# Patient Record
Sex: MALE | Race: OTHER | NOT HISPANIC OR LATINO | Employment: FULL TIME | URBAN - METROPOLITAN AREA
[De-identification: names, ages, dates, MRNs, and addresses within clinical notes are randomized per-mention and may not be internally consistent; named-entity substitution may affect disease eponyms.]

---

## 2023-11-27 LAB — HBA1C MFR BLD: 6.5 % (ref 4–6)

## 2024-02-28 LAB
CHOLEST SERPL-MSCNC: 125 MG/DL (ref 0–200)
CREATININE, URINE: 57.3 MG/DL
HBA1C MFR BLD: 6.1 % (ref 4–6)
HDLC SERPL-MCNC: 38 MG/DL (ref 35–70)
HIV 1+2 AB+HIV1 P24 AG SERPL QL IA: NONREACTIVE
LDLC SERPL CALC-MCNC: 87 MG/DL (ref 0–160)
MICROALB/CREAT RATIO: 19
MICROALBUMIN URINE: 1.1
TRIGL SERPL-MCNC: 175 MG/DL (ref 40–160)

## 2024-06-12 LAB
CHOLEST SERPL-MSCNC: 159 MG/DL (ref 0–200)
CREATININE, URINE: 50 MG/DL
HBA1C MFR BLD: 7.1 % (ref 4–6)
HDLC SERPL-MCNC: 46 MG/DL (ref 35–70)
LDLC SERPL CALC-MCNC: 91 MG/DL (ref 0–160)
MICROALB/CREAT RATIO: <30
MICROALBUMIN URINE: 10
TRIGL SERPL-MCNC: 110 MG/DL (ref 40–160)

## 2024-08-08 ENCOUNTER — TELEPHONE (OUTPATIENT)
Dept: INFECTIOUS DISEASES | Facility: CLINIC | Age: 54
End: 2024-08-08

## 2024-08-08 ENCOUNTER — LAB VISIT (OUTPATIENT)
Dept: LAB | Facility: HOSPITAL | Age: 54
End: 2024-08-08
Attending: GENERAL PRACTICE
Payer: COMMERCIAL

## 2024-08-08 ENCOUNTER — OFFICE VISIT (OUTPATIENT)
Dept: INFECTIOUS DISEASES | Facility: CLINIC | Age: 54
End: 2024-08-08
Payer: COMMERCIAL

## 2024-08-08 VITALS
SYSTOLIC BLOOD PRESSURE: 132 MMHG | RESPIRATION RATE: 18 BRPM | DIASTOLIC BLOOD PRESSURE: 88 MMHG | TEMPERATURE: 98 F | HEART RATE: 76 BPM | HEIGHT: 63 IN | BODY MASS INDEX: 21.97 KG/M2 | OXYGEN SATURATION: 98 % | WEIGHT: 124 LBS

## 2024-08-08 DIAGNOSIS — A53.9 SYPHILIS: Primary | ICD-10-CM

## 2024-08-08 DIAGNOSIS — Z11.3 ROUTINE SCREENING FOR STI (SEXUALLY TRANSMITTED INFECTION): ICD-10-CM

## 2024-08-08 DIAGNOSIS — Z72.51 HIGH RISK SEXUAL BEHAVIOR, UNSPECIFIED TYPE: ICD-10-CM

## 2024-08-08 DIAGNOSIS — A52.71 OCULAR SYPHILIS: ICD-10-CM

## 2024-08-08 DIAGNOSIS — A53.9 SYPHILIS: ICD-10-CM

## 2024-08-08 LAB
ALBUMIN SERPL-MCNC: 4.7 G/DL (ref 3.5–5)
ALBUMIN/GLOB SERPL: 1.5 RATIO (ref 1.1–2)
ALP SERPL-CCNC: 66 UNIT/L (ref 40–150)
ALT SERPL-CCNC: 43 UNIT/L (ref 0–55)
ANION GAP SERPL CALC-SCNC: 9 MEQ/L
AST SERPL-CCNC: 27 UNIT/L (ref 5–34)
BASOPHILS # BLD AUTO: 0.04 X10(3)/MCL
BASOPHILS NFR BLD AUTO: 0.7 %
BILIRUB SERPL-MCNC: 0.6 MG/DL
BUN SERPL-MCNC: 18.3 MG/DL (ref 8.4–25.7)
C TRACH DNA SPEC QL NAA+PROBE: NOT DETECTED
CALCIUM SERPL-MCNC: 9.6 MG/DL (ref 8.4–10.2)
CHLORIDE SERPL-SCNC: 109 MMOL/L (ref 98–107)
CO2 SERPL-SCNC: 20 MMOL/L (ref 22–29)
CREAT SERPL-MCNC: 0.79 MG/DL (ref 0.73–1.18)
CREAT/UREA NIT SERPL: 23
EOSINOPHIL # BLD AUTO: 0.05 X10(3)/MCL (ref 0–0.9)
EOSINOPHIL NFR BLD AUTO: 0.9 %
ERYTHROCYTE [DISTWIDTH] IN BLOOD BY AUTOMATED COUNT: 12.2 % (ref 11.5–17)
GFR SERPLBLD CREATININE-BSD FMLA CKD-EPI: >60 ML/MIN/1.73/M2
GLOBULIN SER-MCNC: 3.2 GM/DL (ref 2.4–3.5)
GLUCOSE SERPL-MCNC: 145 MG/DL (ref 74–100)
HBV CORE AB SERPL QL IA: NONREACTIVE
HBV CORE IGM SERPL QL IA: NONREACTIVE
HBV SURFACE AB SER-ACNC: 36.58 MIU/ML
HBV SURFACE AB SERPL IA-ACNC: REACTIVE M[IU]/ML
HBV SURFACE AG SERPL QL IA: NONREACTIVE
HCT VFR BLD AUTO: 51 % (ref 42–52)
HCV AB SERPL QL IA: NONREACTIVE
HGB BLD-MCNC: 17 G/DL (ref 14–18)
HIV 1+2 AB+HIV1 P24 AG SERPL QL IA: NONREACTIVE
IMM GRANULOCYTES # BLD AUTO: 0.04 X10(3)/MCL (ref 0–0.04)
IMM GRANULOCYTES NFR BLD AUTO: 0.7 %
LYMPHOCYTES # BLD AUTO: 1.47 X10(3)/MCL (ref 0.6–4.6)
LYMPHOCYTES NFR BLD AUTO: 26.7 %
MCH RBC QN AUTO: 29.7 PG (ref 27–31)
MCHC RBC AUTO-ENTMCNC: 33.3 G/DL (ref 33–36)
MCV RBC AUTO: 89 FL (ref 80–94)
MONOCYTES # BLD AUTO: 0.36 X10(3)/MCL (ref 0.1–1.3)
MONOCYTES NFR BLD AUTO: 6.5 %
N GONORRHOEA DNA SPEC QL NAA+PROBE: NOT DETECTED
NEUTROPHILS # BLD AUTO: 3.55 X10(3)/MCL (ref 2.1–9.2)
NEUTROPHILS NFR BLD AUTO: 64.5 %
NRBC BLD AUTO-RTO: 0 %
PLATELET # BLD AUTO: 181 X10(3)/MCL (ref 130–400)
PLATELETS.RETICULATED NFR BLD AUTO: 5.3 % (ref 0.9–11.2)
PMV BLD AUTO: 11.6 FL (ref 7.4–10.4)
POTASSIUM SERPL-SCNC: 4.2 MMOL/L (ref 3.5–5.1)
PROT SERPL-MCNC: 7.9 GM/DL (ref 6.4–8.3)
RBC # BLD AUTO: 5.73 X10(6)/MCL (ref 4.7–6.1)
SODIUM SERPL-SCNC: 138 MMOL/L (ref 136–145)
SOURCE (OHS): NORMAL
T PALLIDUM AB SER QL: REACTIVE
WBC # BLD AUTO: 5.51 X10(3)/MCL (ref 4.5–11.5)

## 2024-08-08 PROCEDURE — 86592 SYPHILIS TEST NON-TREP QUAL: CPT

## 2024-08-08 PROCEDURE — 86780 TREPONEMA PALLIDUM: CPT

## 2024-08-08 PROCEDURE — 87340 HEPATITIS B SURFACE AG IA: CPT

## 2024-08-08 PROCEDURE — 86706 HEP B SURFACE ANTIBODY: CPT

## 2024-08-08 PROCEDURE — 87491 CHLMYD TRACH DNA AMP PROBE: CPT

## 2024-08-08 PROCEDURE — 86704 HEP B CORE ANTIBODY TOTAL: CPT

## 2024-08-08 PROCEDURE — 36415 COLL VENOUS BLD VENIPUNCTURE: CPT

## 2024-08-08 PROCEDURE — 87591 N.GONORRHOEAE DNA AMP PROB: CPT

## 2024-08-08 PROCEDURE — 86803 HEPATITIS C AB TEST: CPT

## 2024-08-08 PROCEDURE — 86705 HEP B CORE ANTIBODY IGM: CPT

## 2024-08-08 PROCEDURE — 80053 COMPREHEN METABOLIC PANEL: CPT

## 2024-08-08 PROCEDURE — 86480 TB TEST CELL IMMUN MEASURE: CPT

## 2024-08-08 PROCEDURE — 87389 HIV-1 AG W/HIV-1&-2 AB AG IA: CPT

## 2024-08-08 PROCEDURE — 85025 COMPLETE CBC W/AUTO DIFF WBC: CPT

## 2024-08-08 PROCEDURE — 99999 PR PBB SHADOW E&M-NEW PATIENT-LVL III: CPT | Mod: PBBFAC,,, | Performed by: GENERAL PRACTICE

## 2024-08-08 RX ORDER — ATORVASTATIN CALCIUM 20 MG/1
20 TABLET, FILM COATED ORAL DAILY
COMMUNITY

## 2024-08-08 RX ORDER — TIMOLOL MALEATE 5 MG/ML
SOLUTION/ DROPS OPHTHALMIC
COMMUNITY

## 2024-08-08 RX ORDER — EMTRICITABINE AND TENOFOVIR DISOPROXIL FUMARATE 200; 300 MG/1; MG/1
1 TABLET, FILM COATED ORAL DAILY
COMMUNITY

## 2024-08-08 RX ORDER — EMPAGLIFLOZIN AND LINAGLIPTIN 10; 5 MG/1; MG/1
1 TABLET, FILM COATED ORAL DAILY
COMMUNITY

## 2024-08-08 RX ORDER — LOSARTAN POTASSIUM 50 MG/1
50 TABLET ORAL DAILY
COMMUNITY

## 2024-08-08 RX ORDER — EMTRICITABINE AND TENOFOVIR ALAFENAMIDE 200; 25 MG/1; MG/1
1 TABLET ORAL DAILY
COMMUNITY

## 2024-08-08 NOTE — TELEPHONE ENCOUNTER
Called the [facility] to speak with someone about past medical history of previous diagnosis that he is now being seen here

## 2024-08-09 LAB
C TRACH RRNA SPEC QL NAA+PROBE: NEGATIVE
C TRACH RRNA SPEC QL NAA+PROBE: NEGATIVE
N GONORRHOEA RRNA SPEC QL NAA+PROBE: NEGATIVE
N GONORRHOEA RRNA SPEC QL NAA+PROBE: NEGATIVE
RPR SER QL: REACTIVE
RPR SER-TITR: ABNORMAL {TITER}
SPECIMEN SOURCE: NORMAL

## 2024-08-12 LAB
GAMMA INTERFERON BACKGROUND BLD IA-ACNC: 0.14 IU/ML
M TB IFN-G BLD-IMP: NEGATIVE
M TB IFN-G CD4+ BCKGRND COR BLD-ACNC: 0.04 IU/ML
M TB IFN-G CD4+CD8+ BCKGRND COR BLD-ACNC: 0.02 IU/ML
MITOGEN IGNF BCKGRD COR BLD-ACNC: 9.86 IU/ML

## 2024-09-05 ENCOUNTER — TELEPHONE (OUTPATIENT)
Dept: INFECTIOUS DISEASES | Facility: CLINIC | Age: 54
End: 2024-09-05
Payer: COMMERCIAL

## 2024-09-05 NOTE — TELEPHONE ENCOUNTER
His records received from his outpatient infectious disease physician.  Please see notes below.  It appears the patient has completed a course of IV penicillin as well as IV ceftriaxone as well as 3 weeks of IM benzathine penicillin as recently as 04/2024.  His RPR most recently on 02/28/2024 has been at 1:32, his repeat currently is 1:64.  Does not have any significant concerns at this time and given his extensive recent treatment, I will not immediately act on this minimal elevation in RPR.  He will need a repeat RPR in 3 months, if this continues to increase, he will have to undergo another course of therapy, would likely consider another IV course.  Patient is scheduled for follow-up with us in the office on 09/12/2024.

## 2024-09-10 ENCOUNTER — DOCUMENTATION ONLY (OUTPATIENT)
Dept: FAMILY MEDICINE | Facility: CLINIC | Age: 54
End: 2024-09-10

## 2024-09-10 ENCOUNTER — TELEPHONE (OUTPATIENT)
Dept: FAMILY MEDICINE | Facility: CLINIC | Age: 54
End: 2024-09-10

## 2024-09-10 ENCOUNTER — PATIENT OUTREACH (OUTPATIENT)
Facility: CLINIC | Age: 54
End: 2024-09-10
Payer: COMMERCIAL

## 2024-09-10 ENCOUNTER — HOSPITAL ENCOUNTER (OUTPATIENT)
Dept: RADIOLOGY | Facility: HOSPITAL | Age: 54
Discharge: HOME | End: 2024-09-10
Attending: FAMILY MEDICINE
Payer: COMMERCIAL

## 2024-09-10 ENCOUNTER — OFFICE VISIT (OUTPATIENT)
Dept: FAMILY MEDICINE | Facility: CLINIC | Age: 54
End: 2024-09-10
Payer: COMMERCIAL

## 2024-09-10 VITALS
RESPIRATION RATE: 18 BRPM | HEART RATE: 70 BPM | HEIGHT: 63 IN | SYSTOLIC BLOOD PRESSURE: 124 MMHG | WEIGHT: 123.31 LBS | DIASTOLIC BLOOD PRESSURE: 82 MMHG | BODY MASS INDEX: 21.85 KG/M2 | OXYGEN SATURATION: 98 %

## 2024-09-10 DIAGNOSIS — E78.5 HYPERLIPIDEMIA ASSOCIATED WITH TYPE 2 DIABETES MELLITUS: Primary | ICD-10-CM

## 2024-09-10 DIAGNOSIS — Z01.818 PREOP EXAMINATION: ICD-10-CM

## 2024-09-10 DIAGNOSIS — E11.65 TYPE 2 DIABETES MELLITUS WITH HYPERGLYCEMIA, WITHOUT LONG-TERM CURRENT USE OF INSULIN: ICD-10-CM

## 2024-09-10 DIAGNOSIS — R80.9 MICROALBUMINURIA: ICD-10-CM

## 2024-09-10 DIAGNOSIS — E11.59 HYPERTENSION ASSOCIATED WITH DIABETES: ICD-10-CM

## 2024-09-10 DIAGNOSIS — E11.69 HYPERLIPIDEMIA ASSOCIATED WITH TYPE 2 DIABETES MELLITUS: Primary | ICD-10-CM

## 2024-09-10 DIAGNOSIS — A52.71 OCULAR SYPHILIS: ICD-10-CM

## 2024-09-10 DIAGNOSIS — I15.2 HYPERTENSION ASSOCIATED WITH DIABETES: ICD-10-CM

## 2024-09-10 PROBLEM — I10 ESSENTIAL HYPERTENSION: Status: ACTIVE | Noted: 2024-03-12

## 2024-09-10 PROBLEM — A52.8 LATE LATENT SYPHILIS: Status: ACTIVE | Noted: 2024-02-12

## 2024-09-10 PROBLEM — E11.9 DIABETES MELLITUS: Status: ACTIVE | Noted: 2021-02-04

## 2024-09-10 PROBLEM — E55.9 VITAMIN D DEFICIENCY: Status: ACTIVE | Noted: 2021-02-04

## 2024-09-10 PROBLEM — E78.00 HYPERCHOLESTEROLEMIA: Status: ACTIVE | Noted: 2024-03-12

## 2024-09-10 PROCEDURE — 3008F BODY MASS INDEX DOCD: CPT | Mod: CPTII,,, | Performed by: FAMILY MEDICINE

## 2024-09-10 PROCEDURE — 99204 OFFICE O/P NEW MOD 45 MIN: CPT | Mod: ,,, | Performed by: FAMILY MEDICINE

## 2024-09-10 PROCEDURE — 1160F RVW MEDS BY RX/DR IN RCRD: CPT | Mod: CPTII,,, | Performed by: FAMILY MEDICINE

## 2024-09-10 PROCEDURE — 1159F MED LIST DOCD IN RCRD: CPT | Mod: CPTII,,, | Performed by: FAMILY MEDICINE

## 2024-09-10 PROCEDURE — 3079F DIAST BP 80-89 MM HG: CPT | Mod: CPTII,,, | Performed by: FAMILY MEDICINE

## 2024-09-10 PROCEDURE — 71046 X-RAY EXAM CHEST 2 VIEWS: CPT | Mod: TC

## 2024-09-10 PROCEDURE — 3074F SYST BP LT 130 MM HG: CPT | Mod: CPTII,,, | Performed by: FAMILY MEDICINE

## 2024-09-10 RX ORDER — METFORMIN HYDROCHLORIDE 850 MG/1
850 TABLET ORAL
Qty: 90 TABLET | Refills: 0 | Status: SHIPPED | OUTPATIENT
Start: 2024-09-10 | End: 2024-12-09

## 2024-09-10 RX ORDER — DORZOLAMIDE HYDROCHLORIDE AND TIMOLOL MALEATE 20; 5 MG/ML; MG/ML
1 SOLUTION/ DROPS OPHTHALMIC 2 TIMES DAILY
COMMUNITY

## 2024-09-10 RX ORDER — PREDNISOLONE ACETATE 10 MG/ML
SUSPENSION/ DROPS OPHTHALMIC
COMMUNITY

## 2024-09-10 RX ORDER — BROMFENAC SODIUM 0.7 MG/ML
SOLUTION/ DROPS OPHTHALMIC
COMMUNITY

## 2024-09-10 RX ORDER — METFORMIN HYDROCHLORIDE 850 MG/1
850 TABLET ORAL
COMMUNITY
End: 2024-09-10 | Stop reason: SDUPTHER

## 2024-09-10 RX ORDER — LOSARTAN POTASSIUM 50 MG/1
50 TABLET ORAL DAILY
Qty: 90 TABLET | Refills: 0 | Status: SHIPPED | OUTPATIENT
Start: 2024-09-10 | End: 2024-12-09

## 2024-09-10 RX ORDER — ATORVASTATIN CALCIUM 20 MG/1
20 TABLET, FILM COATED ORAL DAILY
Qty: 90 TABLET | Refills: 0 | Status: SHIPPED | OUTPATIENT
Start: 2024-09-10 | End: 2024-12-09

## 2024-09-10 NOTE — PROGRESS NOTES
Health Maintenance Topic(s) Outreach Outcomes & Actions Taken:    Colorectal Cancer Screening - Outreach Outcomes & Actions Taken  : External Records Requested & Care Team Updated if Applicable         Additional Notes:  Request Colonoscopy Report: [facility] [place]

## 2024-09-10 NOTE — TELEPHONE ENCOUNTER
Had to leave a message on the VM at Dr Kitchen's office requesting the surgery clearance form to be faxed to our office. Will follow up.

## 2024-09-10 NOTE — TELEPHONE ENCOUNTER
Colonoscopy from [facility] gastroenterology in [facility]

## 2024-09-10 NOTE — PROGRESS NOTES
Chance Vu  09/10/2024  [id number]    Pinky Cr MD  Patient Care Team:  Pinky Cr MD as PCP - General (Family Medicine)      Chief Complaint:  Chief Complaint   Patient presents with    [facility] Care     Establishing Care  Needs surgery clearance before cataract surgery  Lives in [place], [place]  Chest Pain  Approx 1 week       History of Present Illness:    54 y.o. male who presents today to establish care. 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 He has received treatment. He needs cataract surgery clearance and then will be moving back to [place], [place]. He will not be following up here. Has never had surgery before. He is asymptomatic and does not smoke.     Preop exam  Active CP-NO  Heart Failure-NO  Valvular Disease-NO  History of CVA-NO  DM? Yes On Insulin? NO  Serum Creatinine >(2.0)-NO    ?Can take care of self, such as eat, dress, or use the toilet (1 MET)-Yes  ?Can walk up a flight of steps or a hill or walk on level ground at 3 to 4 mph (4 METs)-Yes      Review of Systems  General: denies f/c, weight loss, night sweats, decreased appetite  Eye: denies blurred vision, changes in vision  Respiratory: denies sob, wheezing, cough  Cardiovascular: denies chest pain, palpitations, edema  Gastrointestinal: denies abdominal pain, n/v, constipation, diarrhea  Integumentary: denies rashes, pruritis    Past Medical History  Past Medical History:   Diagnosis Date    Diabetes mellitus, type 2     Hyperlipidemia     Hypertension     Syphilis, unspecified        Medications  Medication List with Changes/Refills   Current Medications    BROMFENAC (PROLENSA) 0.07 % DROP        DORZOLAMIDE-TIMOLOL 2-0.5% (COSOPT) 22.3-6.8 MG/ML OPHTHALMIC SOLUTION    Place 1 drop into both eyes 2 (two) times daily.    EMPAGLIFLOZIN-LINAGLIPTIN (GLYXAMBI) 10-5 MG TAB    Take 1 tablet by mouth once daily.    PREDNISOLONE ACETATE (PRED FORTE) 1 % DRPS    INSTILL 1 DROP INTO BOTH EYES SIX TIMES A DAY     "TIMOLOL MALEATE 0.5% (TIMOPTIC) 0.5 % DROP    INSTILL 1 DROP INTO LEFT EYE TWICE A DAY   Changed and/or Refilled Medications    Modified Medication Previous Medication    ATORVASTATIN (LIPITOR) 20 MG TABLET atorvastatin (LIPITOR) 20 MG tablet       Take 1 tablet (20 mg total) by mouth once daily.    Take 20 mg by mouth once daily.    LOSARTAN (COZAAR) 50 MG TABLET losartan (COZAAR) 50 MG tablet       Take 1 tablet (50 mg total) by mouth once daily.    Take 50 mg by mouth once daily.    METFORMIN (GLUCOPHAGE) 850 MG TABLET metFORMIN (GLUCOPHAGE) 850 MG tablet       Take 1 tablet (850 mg total) by mouth daily with breakfast.    Take 850 mg by mouth.   Discontinued Medications    EMTRICITABINE-TENOFOVIR 200-300 MG (TRUVADA) 200-300 MG TAB    Take 1 tablet by mouth once daily.    EMTRICITABINE-TENOFOVIR ALAFEN (DESCOVY) 200-25 MG TAB    Take 1 tablet by mouth once daily.       Past Surgical History:   Procedure Laterality Date    CIRCUMCISION         SUBJECTIVE:  Health Maintenance  Health Maintenance Topics with due status: Not Due       Topic Last Completion Date    Low Dose Statin 09/10/2024    Diabetes Urine Screening 09/10/2024    Lipid Panel 09/10/2024    Hemoglobin A1c 09/10/2024     Health Maintenance Due   Topic Date Due    Pneumococcal Vaccines (Age 0-64) (1 of 2 - PCV) Never done    Foot Exam  Never done    Eye Exam  Never done    TETANUS VACCINE  Never done    Colorectal Cancer Screening  Never done    Influenza Vaccine (1) 09/01/2024    COVID-19 Vaccine (4 - 2023-24 season) 09/01/2024       Exam:  Vitals:    09/10/24 0757   BP: 124/82   BP Location: Right arm   Patient Position: Sitting   BP Method: Small (Automatic)   Pulse: 70   Resp: 18   SpO2: 98%   Weight: 55.9 kg (123 lb 4.8 oz)   Height: 5' 3" (1.6 m)     Weight: 55.9 kg (123 lb 4.8 oz)   Body mass index is 21.84 kg/m².      Physical Exam  Constitutional: NAD, alert, pleasant  Respiratory: CTAB, no wheezes, rales or rhonchi. No accessory muscle " use  Eyes: EOMI  Cardiovascular: RRR, No m/r/g. No JVD. No LE edema  Gastrointestinal: BS+, nontender, nondistended  Integumentary: warm, dry, intact  Psych: AA&Ox3      ICD-10-CM ICD-9-CM   1. Hyperlipidemia associated with type 2 diabetes mellitus  E11.69 250.80    E78.5 272.4   2. Type 2 diabetes mellitus with hyperglycemia, without long-term current use of insulin  E11.65 250.00     790.29   3. Ocular syphilis  A52.71 095.8     363.13   4. Microalbuminuria  R80.9 791.0   5. Preop examination  Z01.818 V72.84   6. Hypertension associated with diabetes  E11.59 250.80    I15.2 401.9       1. Hyperlipidemia associated with type 2 diabetes mellitus  Overview:  Ldl 91 on lipitor 20 mg daily.     Continue current Rx meds      Orders:  -     CBC Auto Differential; Future; Expected date: 09/10/2024  -     Comprehensive Metabolic Panel; Future; Expected date: 09/10/2024    2. Type 2 diabetes mellitus with hyperglycemia, without long-term current use of insulin  Overview:  A1c increased from 6.1 to 7.1 June 2024. Supposed to be on glyxambi but has been out for 2 wks and found an old script of metformin 850 mg daily. Not having diarrhea. Fasting sugars 114-150's.    Labs ordered  Refill metformin  Hold glyxambi for now    Orders:  -     EKG 12-lead; Future; Expected date: 09/10/2024  -     X-Ray Chest PA And Lateral; Future; Expected date: 09/10/2024  -     CBC Auto Differential; Future; Expected date: 09/10/2024  -     Comprehensive Metabolic Panel; Future; Expected date: 09/10/2024  -     Lipid Panel; Future; Expected date: 09/10/2024  -     Hemoglobin A1C; Future; Expected date: 09/10/2024  -     Urinalysis; Future; Expected date: 09/10/2024  -     Microalbumin/Creatinine Ratio, Urine; Future; Expected date: 09/10/2024    3. Ocular syphilis  Overview:  Followed by ID. Has completed treatment with little improvement in RPR.       4. Microalbuminuria  Overview:  Last urine microalbumin 10. A1c increased to 7.1. On  ARB    Labs and microalbumin ordered    Orders:  -     Microalbumin/Creatinine Ratio, Urine; Future; Expected date: 09/10/2024    5. Preop examination  -     EKG 12-lead; Future; Expected date: 09/10/2024  -     X-Ray Chest PA And Lateral; Future; Expected date: 09/10/2024  -     CBC Auto Differential; Future; Expected date: 09/10/2024  -     Comprehensive Metabolic Panel; Future; Expected date: 09/10/2024  -     Lipid Panel; Future; Expected date: 09/10/2024  -     Hemoglobin A1C; Future; Expected date: 09/10/2024  -     Urinalysis; Future; Expected date: 09/10/2024  -     Microalbumin/Creatinine Ratio, Urine; Future; Expected date: 09/10/2024    6. Hypertension associated with diabetes  Overview:  At goal on losartan 50 mg daily. Asymptomatic    Continue current Rx meds  Refill losartan      Other orders  -     metFORMIN (GLUCOPHAGE) 850 MG tablet; Take 1 tablet (850 mg total) by mouth daily with breakfast.  Dispense: 90 tablet; Refill: 0  -     atorvastatin (LIPITOR) 20 MG tablet; Take 1 tablet (20 mg total) by mouth once daily.  Dispense: 90 tablet; Refill: 0  -     losartan (COZAAR) 50 MG tablet; Take 1 tablet (50 mg total) by mouth once daily.  Dispense: 90 tablet; Refill: 0       Will need labs, EKG, cxr for risk stratification  Refill metformin, lipitor and losartan  Hold glyxambi for now since fasting sugars are at goal.     Labs, EKG, cxr reviewed. Patient is low risk for low risk procedure  Follow up: No follow-ups on file.      Care Plan/Goals: Reviewed   Goals    None

## 2024-09-10 NOTE — LETTER
AUTHORIZATION FOR RELEASE OF CONFIDENTIAL INFORMATION      09/10/2024      Dear [facility],    We are seeing Chance Vu, date of birth 1970, in the clinic at [facility].  Pinky Cr MD is the patient's PCP. Chance Vu has an outstanding lab/procedure at the time we reviewed his chart.  In order to help keep his health information updated, Chance has authorized us to request the following medical record(s):          Colonoscopy           Please fax any records to Pinky Cr MD's at  [phone]    If you have any questions, please contact  Anil REICH,CCC @ [phone]             Patient Name: Chance Vu  :1970  Patient Phone #:[phone]                Chance Vu  MRN: [id number]  : 1970  Age: 54 y.o.  Sex: male         Patient/Legal Guardian Signature  This signature was collected at 2024    Chance Vu       _______________________________   Printed Name/Relationship to Patient      Consent for Examination and Treatment: I hereby authorize the providers and employees of [facility] ([facility]) to provide medical treatment/services which includes, but is not limited to, performing and administering tests and diagnostic procedures that are deemed necessary, including, but not limited to, imaging examinations, blood tests and other laboratory procedures as may be required by the hospital, clinic, or may be ordered by my physician(s) or persons working under the general and/or special instructions of my physician(s).      I understand and agree that this consent covers all authorized persons, including but not limited to physicians, residents, nurse practitioners, physicians' assistants, specialists, consultants, student nurses, and independently contracted physicians, who are called upon by the physician in charge, to carry out the diagnostic procedures and medical or surgical treatment.     I hereby  authorize [facility] to retain or dispose of any specimens or tissue, should there be such remaining from any test or procedure.     I hereby authorize and give consent for [facility] providers and employees to take photographs, images or videotapes of such diagnostic, surgical or treatment procedures of Patient as may be required by [facility] or as may be ordered by a physician. I further acknowledge and agree that [facility] may use cameras or other devices for patient monitoring.     I am aware that the practice of medicine is not an exact science, and I acknowledge that no guarantees have been made to me as to the outcome of any tests, procedures or treatment.     Authorization for Release of Information: I understand that my insurance company and/or their agents may need information necessary to make determinations about payment/reimbursement. I hereby provide authorization to release to all insurance companies, their successors, assignees, other parties with whom they may have contracted, or others acting on their behalf, that are involved with payment for any hospital and/or clinic charges incurred by the patient, any information that they request and deem necessary for payment/reimbursement, and/or quality review.  I further authorize the release of my health information to physicians or other health care practitioners on staff who are involved in my health care now and in the future, and to other health care providers, entities, or institutions for the purpose of my continued care and treatment, including referrals.     REGISTRATION AUTHORIZATION  Form No. 04816 (Rev. 3/25/2024)    Page 1 of 3                       Medicare Patient's Certification and Authorization to Release Information and Payment Request:  I certify that the information given by me in applying for payment under Title XVIII of the Social Security Act is correct. I authorize any huggins of medical or other information about me to release to the [facility], or its intermediaries or carriers, any information needed for this or a related Medicare claim. I request that payment of authorized benefits be made on my behalf.     Assignment of Insurance Benefits:   I hereby authorize any and all insurance companies, health plans, defined   benefit plans, health insurers or any entity that is or may be responsible for payment of my medical expenses to pay all hospital and medical benefits now due, and to become due and payable to me under any hospital benefits, sick benefits, injury benefits or any other benefit for services rendered to me, including Major Medical Benefits, direct to [facility] and all independently contracted physicians. I assign any and all rights that I may have against any and all insurance companies, health plans, defined benefit plans, health insurers or any entity that is or may be responsible for payment of my medical expenses, including, but not limited to any right to appeal a denial of a claim, any right to bring any action, lawsuit, administrative proceeding, or other cause of action on my behalf. I specifically assign my right to pursue litigation against any and all insurance companies, health plans, defined benefit plans, health insurers or any entity that is or may be responsible for payment of my medical expenses based upon a refusal to pay charges.            E. Valuables: It is understood and agreed that [facility] is not liable for the damage to or loss of any money, jewelry,   documents, dentures, eye glasses, hearing aids, prosthetics, or other property of value.     F. Computer Equipment: I understand and agree that should I choose to use computer equipment owned by [facility] or if I choose to access the Internet via [facility]s network, I do so at my own risk. [facility] is not responsible for any damage to my computer equipment or to any damages of any type that might arise from my loss of equipment or data.     G. Acceptance  of Financial Responsibility:  I agree that in consideration of the services and   supplies that have been   or will be furnished to the patient, I am hereby obligated to pay all charges made for or on the account of the patient according to the standard rates (in effect at the time the services and supplies are delivered) established by [facility], including its Patient Financial Assistance Policy to the extent it is applicable. I understand that I am responsible for all charges, or portions thereof, not covered by insurance or other sources. Patient refunds will be distributed only after balances at all [facility] facilities are paid.     H. Communication Authorization:  I hereby authorize [facility] and its representatives, along with any billing service   or  who may work on their behalf, to contact me on   my cell phone and/or home phone using pre- recorded messages, artificial voice messages, automatic telephone dialing devices or other computer assisted technology, or by electronic      mail, text messaging, or by any other form of electronic communication. This includes, but is not limited to, appointment reminders, yearly physical exam reminders, preventive care reminders, patient campaigns, welcome calls, and calls about account balances on my account or any account on which I am listed as a guarantor. I understand I have the right to opt out of these communications at any time.      Relationship  Between  Facility and  Provider:      I understand that some, but not all, providers furnishing services to the patient are not employees or agents of [facility]. The patient is under the care and supervision of his/her attending physician, and it is the responsibility of the facility and its nursing staff to carry out the instructions of such physicians. It is the responsibility of the patient's physician/designee to obtain the patient's informed consent, when required, for medical or surgical treatment,  special diagnostic or therapeutic procedures, or hospital services rendered for the patient under the special instructions of the physician/designee.           REGISTRATION AUTHORIZATION  Form No. 25264 (Rev. 3/25/2024)    Page 2 of 3                       Immunizations: [facility] shares immunization information with state sponsored health departments to help you and your doctor keep track of your immunization records. By signing, you consent to have this information shared with the health department in your state:                                [place] - LINKS ([place] Immunization Network for Kids Statewide)                                [place] - MIIX ([place] Immunization Information eXchange)                                [place] - ImmPRINT (Immunization Patient Registry with Integrated Technology)     TERM: This authorization is valid for this and subsequent care/treatment I receive at [facility] and will remain valid unless/until revoked in writing by me.     [facility]: As used in this document, [facility] means all [facility] owned and managed facilities, including, but not limited to, all health centers, surgery centers, clinics, urgent care centers, and hospitals.         [facility] complies with applicable Federal civil rights laws and does not discriminate on the basis of race, color, national origin, age, disability, or sex.  ATENCIÓN: si habla español, tiene a smith disposición servicios gratuitos de asistencia lingüística. Roe ford [phone].  CHÚ Ý: N?u b?n nói Ti?ng Vi?t, có các d?ch v? h? tr? ngôn ng? mi?n phí dành cho b?n. G?i s? [phone].        REGISTRATION AUTHORIZATION  Form No. 15260 (Rev. 3/25/2024)   Page 3 of 3

## 2024-09-12 ENCOUNTER — LAB VISIT (OUTPATIENT)
Dept: LAB | Facility: HOSPITAL | Age: 54
End: 2024-09-12
Payer: COMMERCIAL

## 2024-09-12 ENCOUNTER — OFFICE VISIT (OUTPATIENT)
Dept: INFECTIOUS DISEASES | Facility: CLINIC | Age: 54
End: 2024-09-12
Payer: COMMERCIAL

## 2024-09-12 VITALS
DIASTOLIC BLOOD PRESSURE: 82 MMHG | OXYGEN SATURATION: 97 % | RESPIRATION RATE: 18 BRPM | BODY MASS INDEX: 21.44 KG/M2 | HEIGHT: 63 IN | HEART RATE: 78 BPM | WEIGHT: 121 LBS | SYSTOLIC BLOOD PRESSURE: 123 MMHG

## 2024-09-12 DIAGNOSIS — E11.65 TYPE 2 DIABETES MELLITUS WITH HYPERGLYCEMIA, WITHOUT LONG-TERM CURRENT USE OF INSULIN: ICD-10-CM

## 2024-09-12 DIAGNOSIS — A53.9 SYPHILIS: Primary | ICD-10-CM

## 2024-09-12 DIAGNOSIS — A52.8 LATE LATENT SYPHILIS: ICD-10-CM

## 2024-09-12 DIAGNOSIS — A53.9 SYPHILIS: ICD-10-CM

## 2024-09-12 DIAGNOSIS — A52.71 OCULAR SYPHILIS: ICD-10-CM

## 2024-09-12 LAB — T PALLIDUM AB SER QL: REACTIVE

## 2024-09-12 PROCEDURE — 4010F ACE/ARB THERAPY RXD/TAKEN: CPT | Mod: CPTII,S$GLB,,

## 2024-09-12 PROCEDURE — 3074F SYST BP LT 130 MM HG: CPT | Mod: CPTII,S$GLB,,

## 2024-09-12 PROCEDURE — 99214 OFFICE O/P EST MOD 30 MIN: CPT | Mod: S$GLB,,,

## 2024-09-12 PROCEDURE — 3066F NEPHROPATHY DOC TX: CPT | Mod: CPTII,S$GLB,,

## 2024-09-12 PROCEDURE — 1159F MED LIST DOCD IN RCRD: CPT | Mod: CPTII,S$GLB,,

## 2024-09-12 PROCEDURE — 3061F NEG MICROALBUMINURIA REV: CPT | Mod: CPTII,S$GLB,,

## 2024-09-12 PROCEDURE — 86592 SYPHILIS TEST NON-TREP QUAL: CPT

## 2024-09-12 PROCEDURE — 36415 COLL VENOUS BLD VENIPUNCTURE: CPT

## 2024-09-12 PROCEDURE — 3044F HG A1C LEVEL LT 7.0%: CPT | Mod: CPTII,S$GLB,,

## 2024-09-12 PROCEDURE — 86780 TREPONEMA PALLIDUM: CPT

## 2024-09-12 PROCEDURE — 3079F DIAST BP 80-89 MM HG: CPT | Mod: CPTII,S$GLB,,

## 2024-09-12 PROCEDURE — 3008F BODY MASS INDEX DOCD: CPT | Mod: CPTII,S$GLB,,

## 2024-09-12 PROCEDURE — 99999 PR PBB SHADOW E&M-EST. PATIENT-LVL IV: CPT | Mod: PBBFAC,,,

## 2024-09-12 NOTE — PROGRESS NOTES
Subjective:       Patient ID: Chance Vu 54 y.o.     Chief Complaint:   Chief Complaint   Patient presents with    4WEEK F/U         HPI:  08/08/2024:  54-year-old male patient known to have past medical history significant for DM 2, a syphilis, history of blurry vision with a questionable ocular syphilis, reportedly previously treated, referred to us from Ophthalmology after the patient's evaluation revealed ongoing blurry vision and in order for us to evaluate and further treat syphilis as appropriate.      Patient lives in [facility] and planning a move to [place], he was here visiting family in [place] about a month ago when his vision worsened and he went to see ophthalmology. He was noted to have cataract and was being planned for cataract surgery. He mentioned to his ophthalmologist that he had a history of ocular syphilis and he was referred to us for further evaluation.     The patient reports that he was diagnosed with ocular syphilis about 2 years ago and since then, he has received 3 courses of IV penicillin. First course ws in 12/2022, then in 06/2023 and then again in 05/2024.     He follows with retina specialist Dr. Jocelyne Peña in [place] as well as Dr. Joe Munoz with Infectious disease in [place] as well. And they have been prescribing his courses of antibiotics. Every course of antibiotics has been driven by his visual symptoms and his RPR. It appear there has been a lot of back and forth between the physicians about the ongoing syphilis infection in his eye and the meaning of his RPR. His RPR reportedly was 1:128 initially, dropped to 1:32 then back to 1:64 and again dropped to 1:32 and has reportedly stayed there.     Sexually active with multiple male partners, reports more than 5 in the past year, before last course of antibiotics, was having receptive anal sex and oral sex but currently only using his hands. He is on Truvada  but has historically been somewhat inconsistent for  PrEP, no smoking, no drinking, no drug use.       09/12/2024:   Presents for follow-up today.  He denies any fever, chills, or night sweats.  He denies any worsening vision changes.  He denies any headaches or photophobia.  He does report that he had COVID 3 weeks ago.  He is scheduled for cataract surgeries on 09/25/2024 and 10/09/2024.  He is currently not sexually active.  He has not had any new partners since his last visit.  He plans to have cataract surgeries and then move back to [place].      Past Medical History:   Diagnosis Date    Diabetes mellitus, type 2     Hyperlipidemia     Hypertension     Syphilis, unspecified         Past Surgical History:   Procedure Laterality Date    CIRCUMCISION          Social History     Socioeconomic History    Marital status: Single   Tobacco Use    Smoking status: Never    Smokeless tobacco: Never   Substance and Sexual Activity    Alcohol use: Never    Drug use: Never    Sexual activity: Not Currently     Partners: Male     Birth control/protection: None     Social Determinants of Health     Financial Resource Strain: Low Risk  (9/9/2024)    Overall Financial Resource Strain (CARDIA)     Difficulty of Paying Living Expenses: Not hard at all   Food Insecurity: No Food Insecurity (9/9/2024)    Hunger Vital Sign     Worried About Running Out of Food in the Last Year: Never true     Ran Out of Food in the Last Year: Never true   Physical Activity: Insufficiently Active (9/9/2024)    Exercise Vital Sign     Days of Exercise per Week: 7 days     Minutes of Exercise per Session: 20 min   Stress: No Stress Concern Present (9/9/2024)    [employer] [place] [employer] - Occupational Stress Questionnaire     Feeling of Stress : Not at all   Housing Stability: Unknown (9/9/2024)    Housing Stability Vital Sign     Unable to Pay for Housing in the Last Year: No        Family History   Problem Relation Name Age of Onset    Diabetes Father N/a         Review of patient's  "allergies indicates:  No Known Allergies       There is no immunization history on file for this patient.     Review of Systems   All other systems reviewed and are negative.         Objective:      /82 (BP Location: Right arm)   Pulse 78   Resp 18   Ht 5' 3" (1.6 m)   Wt 54.9 kg (121 lb)   SpO2 97%   BMI 21.43 kg/m²      Physical Exam  Constitutional:       Appearance: Normal appearance.   HENT:      Head: Normocephalic and atraumatic.      Mouth/Throat:      Pharynx: No oropharyngeal exudate or posterior oropharyngeal erythema.   Eyes:      Extraocular Movements: Extraocular movements intact.      Pupils: Pupils are equal, round, and reactive to light.   Cardiovascular:      Rate and Rhythm: Normal rate and regular rhythm.      Heart sounds: No murmur heard.  Pulmonary:      Effort: No respiratory distress.      Breath sounds: No wheezing, rhonchi or rales.   Abdominal:      General: Bowel sounds are normal. There is no distension.      Palpations: Abdomen is soft.      Tenderness: There is no abdominal tenderness. There is no right CVA tenderness or left CVA tenderness.   Musculoskeletal:         General: No swelling or tenderness.      Cervical back: Neck supple. No rigidity or tenderness.   Lymphadenopathy:      Cervical: No cervical adenopathy.   Skin:     Findings: No lesion or rash.   Neurological:      General: No focal deficit present.      Mental Status: He is alert and oriented to person, place, and time. Mental status is at baseline.      Cranial Nerves: No cranial nerve deficit.      Motor: No weakness.   Psychiatric:         Mood and Affect: Mood normal.         Behavior: Behavior normal.          Labs: Reviewed most recent relevant labs available, notable results highlighted in this note    Imaging: Reviewed most recent relevant imaging studies available, notable results highlighted in this note    Assessment:       Problem List Items Addressed This Visit          ID    Syphilis - Primary "    Relevant Orders    SYPHILIS ANTIBODY (WITH REFLEX RPR) (Completed)    Ocular syphilis    Late latent syphilis       Endocrine    Type 2 diabetes mellitus with hyperglycemia, without long-term current use of insulin            Plan:     -no worsening symptoms at this time.  -STI screening is negative  -previous RPR 1:64 on 08/08/2024.  -no new partners since last visit  -recheck RPR today  -if RPR has increased may need treatment.  -patient is not taking prep at this time as he reports that he is not sexually active.  -I instructed him that if he is to become sexually active he will need to use barrier protection to protect him against any STIs.  -will re-request medical records from previous ID physician and Dr. Joe Munoz as well as his ophthalmologist in [place] Dr. Jocelyne Peña  -we will follow up again in 4 weeks    Follow up in about 4 weeks (around 10/10/2024).    Portions of this note dictated using EMR integrated voice recognition software, and may be subject to voice recognition errors not corrected at proofreading. Please contact writer for clarification if needed.    30 minutes of total time spent on the encounter, which includes face to face time and non-face to face time preparing to see the patient (eg, review of tests), Obtaining and/or reviewing separately obtained history, Documenting clinical information in the electronic or other health record, Independently interpreting results (not separately reported) and communicating results to the patient/family/caregiver, or Care coordination (not separately reported).

## 2024-09-13 LAB
RPR SER QL: REACTIVE
RPR SER-TITR: ABNORMAL {TITER}

## 2024-10-15 ENCOUNTER — OFFICE VISIT (OUTPATIENT)
Dept: INFECTIOUS DISEASES | Facility: CLINIC | Age: 54
End: 2024-10-15
Payer: COMMERCIAL

## 2024-10-15 VITALS
HEIGHT: 63 IN | DIASTOLIC BLOOD PRESSURE: 82 MMHG | BODY MASS INDEX: 21.62 KG/M2 | WEIGHT: 122 LBS | RESPIRATION RATE: 18 BRPM | HEART RATE: 87 BPM | OXYGEN SATURATION: 96 % | SYSTOLIC BLOOD PRESSURE: 129 MMHG

## 2024-10-15 DIAGNOSIS — A53.9 SYPHILIS: Primary | ICD-10-CM

## 2024-10-15 DIAGNOSIS — A52.71 OCULAR SYPHILIS: ICD-10-CM

## 2024-10-15 DIAGNOSIS — Z72.51 HIGH RISK SEXUAL BEHAVIOR, UNSPECIFIED TYPE: ICD-10-CM

## 2024-10-15 DIAGNOSIS — E11.65 TYPE 2 DIABETES MELLITUS WITH HYPERGLYCEMIA, WITHOUT LONG-TERM CURRENT USE OF INSULIN: ICD-10-CM

## 2024-10-15 PROCEDURE — 3008F BODY MASS INDEX DOCD: CPT | Mod: CPTII,S$GLB,,

## 2024-10-15 PROCEDURE — 3066F NEPHROPATHY DOC TX: CPT | Mod: CPTII,S$GLB,,

## 2024-10-15 PROCEDURE — 3044F HG A1C LEVEL LT 7.0%: CPT | Mod: CPTII,S$GLB,,

## 2024-10-15 PROCEDURE — 99213 OFFICE O/P EST LOW 20 MIN: CPT | Mod: S$GLB,,,

## 2024-10-15 PROCEDURE — 3074F SYST BP LT 130 MM HG: CPT | Mod: CPTII,S$GLB,,

## 2024-10-15 PROCEDURE — 4010F ACE/ARB THERAPY RXD/TAKEN: CPT | Mod: CPTII,S$GLB,,

## 2024-10-15 PROCEDURE — 3079F DIAST BP 80-89 MM HG: CPT | Mod: CPTII,S$GLB,,

## 2024-10-15 PROCEDURE — 1159F MED LIST DOCD IN RCRD: CPT | Mod: CPTII,S$GLB,,

## 2024-10-15 PROCEDURE — 99999 PR PBB SHADOW E&M-EST. PATIENT-LVL IV: CPT | Mod: PBBFAC,,,

## 2024-10-15 PROCEDURE — 3061F NEG MICROALBUMINURIA REV: CPT | Mod: CPTII,S$GLB,,

## 2024-10-15 NOTE — PROGRESS NOTES
Subjective:       Patient ID: Chance Vu 54 y.o.     Chief Complaint:   Chief Complaint   Patient presents with    4 week f/u Syphilis        HPI:  08/08/2024:  54-year-old male patient known to have past medical history significant for DM 2, a syphilis, history of blurry vision with a questionable ocular syphilis, reportedly previously treated, referred to us from Ophthalmology after the patient's evaluation revealed ongoing blurry vision and in order for us to evaluate and further treat syphilis as appropriate.      Patient lives in [facility] and planning a move to [place], he was here visiting family in [place] about a month ago when his vision worsened and he went to see ophthalmology. He was noted to have cataract and was being planned for cataract surgery. He mentioned to his ophthalmologist that he had a history of ocular syphilis and he was referred to us for further evaluation.     The patient reports that he was diagnosed with ocular syphilis about 2 years ago and since then, he has received 3 courses of IV penicillin. First course ws in 12/2022, then in 06/2023 and then again in 05/2024.     He follows with retina specialist Dr. Jocelyne Peña in [place] as well as Dr. Joe Munoz with Infectious disease in [place] as well. And they have been prescribing his courses of antibiotics. Every course of antibiotics has been driven by his visual symptoms and his RPR. It appear there has been a lot of back and forth between the physicians about the ongoing syphilis infection in his eye and the meaning of his RPR. His RPR reportedly was 1:128 initially, dropped to 1:32 then back to 1:64 and again dropped to 1:32 and has reportedly stayed there.     Sexually active with multiple male partners, reports more than 5 in the past year, before last course of antibiotics, was having receptive anal sex and oral sex but currently only using his hands. He is on Truvada  but has historically been somewhat  inconsistent for PrEP, no smoking, no drinking, no drug use.       09/12/2024:   Presents for follow-up today.  He denies any fever, chills, or night sweats.  He denies any worsening vision changes.  He denies any headaches or photophobia.  He does report that he had COVID 3 weeks ago.  He is scheduled for cataract surgeries on 09/25/2024 and 10/09/2024.  He is currently not sexually active.  He has not had any new partners since his last visit.  He plans to have cataract surgeries and then move back to [place].      10/15/2024:   Denies any fever, headaches, chills, or night sweats  Had cataract surgery on 10/8. Vision is better. He reports that he can drive now.  Denies any new sexual partners in the last month       Past Medical History:   Diagnosis Date    Diabetes mellitus, type 2     Hyperlipidemia     Hypertension     Syphilis, unspecified         Past Surgical History:   Procedure Laterality Date    CIRCUMCISION          Social History     Socioeconomic History    Marital status: Single   Tobacco Use    Smoking status: Never    Smokeless tobacco: Never   Substance and Sexual Activity    Alcohol use: Never    Drug use: Never    Sexual activity: Not Currently     Partners: Male     Birth control/protection: None     Social Drivers of Health     Financial Resource Strain: Low Risk  (9/9/2024)    Overall Financial Resource Strain (CARDIA)     Difficulty of Paying Living Expenses: Not hard at all   Food Insecurity: No Food Insecurity (9/9/2024)    Hunger Vital Sign     Worried About Running Out of Food in the Last Year: Never true     Ran Out of Food in the Last Year: Never true   Physical Activity: Insufficiently Active (9/9/2024)    Exercise Vital Sign     Days of Exercise per Week: 7 days     Minutes of Exercise per Session: 20 min   Stress: No Stress Concern Present (9/9/2024)    [employer] [employer] - Occupational Stress Questionnaire     Feeling of Stress : Not at all   Housing Stability:  "Unknown (9/9/2024)    Housing Stability Vital Sign     Unable to Pay for Housing in the Last Year: No        Family History   Problem Relation Name Age of Onset    Diabetes Father N/a         Review of patient's allergies indicates:  No Known Allergies       There is no immunization history on file for this patient.     Review of Systems   All other systems reviewed and are negative.         Objective:      /82   Pulse 87   Resp 18   Ht 5' 3" (1.6 m)   Wt 55.3 kg (122 lb)   SpO2 96%   BMI 21.61 kg/m²      Physical Exam  Constitutional:       Appearance: Normal appearance.   HENT:      Head: Normocephalic and atraumatic.      Mouth/Throat:      Pharynx: No oropharyngeal exudate or posterior oropharyngeal erythema.   Eyes:      Extraocular Movements: Extraocular movements intact.      Pupils: Pupils are equal, round, and reactive to light.   Cardiovascular:      Rate and Rhythm: Normal rate and regular rhythm.      Heart sounds: No murmur heard.  Pulmonary:      Effort: No respiratory distress.      Breath sounds: No wheezing, rhonchi or rales.   Abdominal:      General: Bowel sounds are normal. There is no distension.      Palpations: Abdomen is soft.      Tenderness: There is no abdominal tenderness. There is no right CVA tenderness or left CVA tenderness.   Musculoskeletal:         General: No swelling or tenderness.      Cervical back: Neck supple. No rigidity or tenderness.   Lymphadenopathy:      Cervical: No cervical adenopathy.   Skin:     Findings: No lesion or rash.   Neurological:      General: No focal deficit present.      Mental Status: He is alert and oriented to person, place, and time. Mental status is at baseline.      Cranial Nerves: No cranial nerve deficit.      Motor: No weakness.   Psychiatric:         Mood and Affect: Mood normal.         Behavior: Behavior normal.          Labs: Reviewed most recent relevant labs available, notable results highlighted in this note    Imaging: " Reviewed most recent relevant imaging studies available, notable results highlighted in this note    Assessment:       Problem List Items Addressed This Visit          ID    Syphilis - Primary    Ocular syphilis       Endocrine    Type 2 diabetes mellitus with hyperglycemia, without long-term current use of insulin       Other    High risk sexual behavior              Plan:   previous ID physician and Dr. Joe Munoz as well as his ophthalmologist in [place] Dr. Jocelyne Peña  -no worsening symptoms at this time.  -his vision has improved with cataract surgery on 10/8  -repeat RPR 1:64 on 9/12/2024  -previous RPR 1:64 on 08/08/2024.  - will need repeat RPR in a few months  -patient is moving back to [place] this weekend. Will send referral to ID in [place]. Patient is requesting ID [place] [id number]   -will refer today   -no interventions at this time  -can follow-up with ID clinic in [place], [place] if needed       No follow-ups on file.    Portions of this note dictated using EMR integrated voice recognition software, and may be subject to voice recognition errors not corrected at proofreading. Please contact writer for clarification if needed.    30 minutes of total time spent on the encounter, which includes face to face time and non-face to face time preparing to see the patient (eg, review of tests), Obtaining and/or reviewing separately obtained history, Documenting clinical information in the electronic or other health record, Independently interpreting results (not separately reported) and communicating results to the patient/family/caregiver, or Care coordination (not separately reported).    Referral to ID in [facility]

## 2024-11-20 ENCOUNTER — TELEPHONE (OUTPATIENT)
Dept: INFECTIOUS DISEASES | Facility: CLINIC | Age: 54
End: 2024-11-20
Payer: COMMERCIAL

## 2024-11-20 DIAGNOSIS — A53.9 SYPHILIS: Primary | ICD-10-CM

## 2024-11-20 NOTE — TELEPHONE ENCOUNTER
SPOKE WITH PT AND HE WILL BE IN [place] THIS WEEKEND.. PT DENIES HEADACHES, FEVER, CHILLS.. PT STATES HE HAS HAD 1 NEW SEXUAL PARTNER BUT THERE WAS NO PENETRATION. PT ALSO HAS AN APPT W/ OPHTHALMOLOGIST ON FRIDAY.. PT WANTS TO HAVE LABS DRAWN AS HE BELIEVES HIS BLURRY VISION STEMS FROM OCULAR SYPHILIS. SLSL  ----- Message from SAI Khan sent at 11/20/2024  2:18 PM CST -----  Regarding: RE: concerns  If the patient is back in [place] I cannot order anything across state lines. If he is there, would suggest he present to the ER for evaluation.  ----- Message -----  From: Manisha Mo MA  Sent: 11/20/2024   2:01 PM CST  To: SAI Khan  Subject: FW: concerns                                     I did sent a referral to Dr. Cristobal Licona in [place].. I called to check on the status but she said that all referrals are looked over by the provider and then approved or denied.. then given to them to schedule.  ----- Message -----  From: Em Aquino  Sent: 11/20/2024  11:06 AM CST  To: #  Subject: concerns                                         Pt states eyes getting blurry and is concern syphilis is reactive, [phone]

## 2024-11-25 ENCOUNTER — LAB VISIT (OUTPATIENT)
Dept: LAB | Facility: HOSPITAL | Age: 54
End: 2024-11-25
Payer: COMMERCIAL

## 2024-11-25 DIAGNOSIS — A53.9 SYPHILIS: ICD-10-CM

## 2024-11-25 LAB
ALBUMIN SERPL-MCNC: 4.7 G/DL (ref 3.5–5)
ALBUMIN/GLOB SERPL: 1.6 RATIO (ref 1.1–2)
ALP SERPL-CCNC: 73 UNIT/L (ref 40–150)
ALT SERPL-CCNC: 31 UNIT/L (ref 0–55)
ANION GAP SERPL CALC-SCNC: 10 MEQ/L
AST SERPL-CCNC: 19 UNIT/L (ref 5–34)
BASOPHILS # BLD AUTO: 0.03 X10(3)/MCL
BASOPHILS NFR BLD AUTO: 0.4 %
BILIRUB SERPL-MCNC: 0.8 MG/DL
BUN SERPL-MCNC: 12 MG/DL (ref 8.4–25.7)
CALCIUM SERPL-MCNC: 9.9 MG/DL (ref 8.4–10.2)
CHLORIDE SERPL-SCNC: 102 MMOL/L (ref 98–107)
CO2 SERPL-SCNC: 27 MMOL/L (ref 22–29)
CREAT SERPL-MCNC: 0.82 MG/DL (ref 0.72–1.25)
CREAT/UREA NIT SERPL: 15
EOSINOPHIL # BLD AUTO: 0.06 X10(3)/MCL (ref 0–0.9)
EOSINOPHIL NFR BLD AUTO: 0.9 %
ERYTHROCYTE [DISTWIDTH] IN BLOOD BY AUTOMATED COUNT: 12.6 % (ref 11.5–17)
GFR SERPLBLD CREATININE-BSD FMLA CKD-EPI: >60 ML/MIN/1.73/M2
GLOBULIN SER-MCNC: 2.9 GM/DL (ref 2.4–3.5)
GLUCOSE SERPL-MCNC: 151 MG/DL (ref 74–100)
HCT VFR BLD AUTO: 46.4 % (ref 42–52)
HGB BLD-MCNC: 15.5 G/DL (ref 14–18)
IMM GRANULOCYTES # BLD AUTO: 0.06 X10(3)/MCL (ref 0–0.04)
IMM GRANULOCYTES NFR BLD AUTO: 0.9 %
LYMPHOCYTES # BLD AUTO: 1.69 X10(3)/MCL (ref 0.6–4.6)
LYMPHOCYTES NFR BLD AUTO: 24.7 %
MCH RBC QN AUTO: 30.3 PG (ref 27–31)
MCHC RBC AUTO-ENTMCNC: 33.4 G/DL (ref 33–36)
MCV RBC AUTO: 90.6 FL (ref 80–94)
MONOCYTES # BLD AUTO: 0.54 X10(3)/MCL (ref 0.1–1.3)
MONOCYTES NFR BLD AUTO: 7.9 %
NEUTROPHILS # BLD AUTO: 4.46 X10(3)/MCL (ref 2.1–9.2)
NEUTROPHILS NFR BLD AUTO: 65.2 %
NRBC BLD AUTO-RTO: 0 %
PLATELET # BLD AUTO: 177 X10(3)/MCL (ref 130–400)
PMV BLD AUTO: 10.5 FL (ref 7.4–10.4)
POTASSIUM SERPL-SCNC: 4.3 MMOL/L (ref 3.5–5.1)
PROT SERPL-MCNC: 7.6 GM/DL (ref 6.4–8.3)
RBC # BLD AUTO: 5.12 X10(6)/MCL (ref 4.7–6.1)
RPR SER QL: REACTIVE
RPR SER-TITR: ABNORMAL {TITER}
SODIUM SERPL-SCNC: 139 MMOL/L (ref 136–145)
T PALLIDUM AB SER QL: REACTIVE
WBC # BLD AUTO: 6.84 X10(3)/MCL (ref 4.5–11.5)

## 2024-11-25 PROCEDURE — 86780 TREPONEMA PALLIDUM: CPT

## 2024-11-25 PROCEDURE — 36415 COLL VENOUS BLD VENIPUNCTURE: CPT

## 2024-11-25 PROCEDURE — 86592 SYPHILIS TEST NON-TREP QUAL: CPT

## 2024-11-25 PROCEDURE — 80053 COMPREHEN METABOLIC PANEL: CPT

## 2024-11-25 PROCEDURE — 85025 COMPLETE CBC W/AUTO DIFF WBC: CPT

## 2024-11-26 ENCOUNTER — HOSPITAL ENCOUNTER (INPATIENT)
Facility: HOSPITAL | Age: 54
LOS: 2 days | Discharge: HOME HEALTH | DRG: 125 | End: 2024-11-29
Attending: STUDENT IN AN ORGANIZED HEALTH CARE EDUCATION/TRAINING PROGRAM | Admitting: INTERNAL MEDICINE
Payer: COMMERCIAL

## 2024-11-26 ENCOUNTER — TELEPHONE (OUTPATIENT)
Dept: INFECTIOUS DISEASES | Facility: CLINIC | Age: 54
End: 2024-11-26
Payer: COMMERCIAL

## 2024-11-26 DIAGNOSIS — A52.71 OCULAR SYPHILIS: ICD-10-CM

## 2024-11-26 DIAGNOSIS — A52.3 NEUROSYPHILIS: Primary | ICD-10-CM

## 2024-11-26 DIAGNOSIS — R07.9 CHEST PAIN: ICD-10-CM

## 2024-11-26 LAB
CHOLEST SERPL-MCNC: 157 MG/DL
CHOLEST/HDLC SERPL: 4 {RATIO} (ref 0–5)
EST. AVERAGE GLUCOSE BLD GHB EST-MCNC: 131.2 MG/DL
HBA1C MFR BLD: 6.2 %
HDLC SERPL-MCNC: 39 MG/DL (ref 35–60)
LDLC SERPL CALC-MCNC: 68 MG/DL (ref 50–140)
POCT GLUCOSE: 150 MG/DL (ref 70–110)
TRIGL SERPL-MCNC: 252 MG/DL (ref 34–140)
VLDLC SERPL CALC-MCNC: 50 MG/DL

## 2024-11-26 PROCEDURE — 63600175 PHARM REV CODE 636 W HCPCS

## 2024-11-26 PROCEDURE — 83036 HEMOGLOBIN GLYCOSYLATED A1C: CPT

## 2024-11-26 PROCEDURE — G0378 HOSPITAL OBSERVATION PER HR: HCPCS

## 2024-11-26 PROCEDURE — 96375 TX/PRO/DX INJ NEW DRUG ADDON: CPT

## 2024-11-26 PROCEDURE — 00JU3ZZ INSPECTION OF SPINAL CANAL, PERCUTANEOUS APPROACH: ICD-10-PCS | Performed by: INTERNAL MEDICINE

## 2024-11-26 PROCEDURE — 36415 COLL VENOUS BLD VENIPUNCTURE: CPT

## 2024-11-26 PROCEDURE — 82962 GLUCOSE BLOOD TEST: CPT

## 2024-11-26 PROCEDURE — 96372 THER/PROPH/DIAG INJ SC/IM: CPT

## 2024-11-26 PROCEDURE — 96374 THER/PROPH/DIAG INJ IV PUSH: CPT

## 2024-11-26 PROCEDURE — 99285 EMERGENCY DEPT VISIT HI MDM: CPT | Mod: 25

## 2024-11-26 PROCEDURE — 80061 LIPID PANEL: CPT

## 2024-11-26 PROCEDURE — 25000003 PHARM REV CODE 250

## 2024-11-26 RX ORDER — MORPHINE SULFATE 2 MG/ML
2 INJECTION, SOLUTION INTRAMUSCULAR; INTRAVENOUS ONCE
Status: COMPLETED | OUTPATIENT
Start: 2024-11-26 | End: 2024-11-26

## 2024-11-26 RX ORDER — ATORVASTATIN CALCIUM 20 MG/1
20 TABLET, FILM COATED ORAL DAILY
Status: DISCONTINUED | OUTPATIENT
Start: 2024-11-27 | End: 2024-11-30 | Stop reason: HOSPADM

## 2024-11-26 RX ORDER — SODIUM CHLORIDE 0.9 % (FLUSH) 0.9 %
10 SYRINGE (ML) INJECTION EVERY 12 HOURS PRN
Status: DISCONTINUED | OUTPATIENT
Start: 2024-11-26 | End: 2024-11-30 | Stop reason: HOSPADM

## 2024-11-26 RX ORDER — NALOXONE HCL 0.4 MG/ML
0.02 VIAL (ML) INJECTION
Status: DISCONTINUED | OUTPATIENT
Start: 2024-11-26 | End: 2024-11-30 | Stop reason: HOSPADM

## 2024-11-26 RX ORDER — CYCLOPENTOLATE HYDROCHLORIDE 10 MG/ML
1 SOLUTION/ DROPS OPHTHALMIC 3 TIMES DAILY
Status: DISCONTINUED | OUTPATIENT
Start: 2024-11-26 | End: 2024-11-30 | Stop reason: HOSPADM

## 2024-11-26 RX ORDER — KETOROLAC TROMETHAMINE 5 MG/ML
1 SOLUTION OPHTHALMIC 2 TIMES DAILY
Status: ON HOLD | COMMUNITY
End: 2024-11-29 | Stop reason: HOSPADM

## 2024-11-26 RX ORDER — IBUPROFEN 200 MG
24 TABLET ORAL
Status: DISCONTINUED | OUTPATIENT
Start: 2024-11-26 | End: 2024-11-30 | Stop reason: HOSPADM

## 2024-11-26 RX ORDER — LABETALOL HCL 20 MG/4 ML
10 SYRINGE (ML) INTRAVENOUS EVERY 4 HOURS PRN
Status: DISCONTINUED | OUTPATIENT
Start: 2024-11-26 | End: 2024-11-30 | Stop reason: HOSPADM

## 2024-11-26 RX ORDER — IBUPROFEN 200 MG
16 TABLET ORAL
Status: DISCONTINUED | OUTPATIENT
Start: 2024-11-26 | End: 2024-11-30 | Stop reason: HOSPADM

## 2024-11-26 RX ORDER — PREDNISOLONE ACETATE 10 MG/ML
1 SUSPENSION/ DROPS OPHTHALMIC
Status: DISCONTINUED | OUTPATIENT
Start: 2024-11-26 | End: 2024-11-30 | Stop reason: HOSPADM

## 2024-11-26 RX ORDER — EMTRICITABINE AND TENOFOVIR ALAFENAMIDE 200; 25 MG/1; MG/1
1 TABLET ORAL DAILY
Status: ON HOLD | COMMUNITY
End: 2024-11-29 | Stop reason: HOSPADM

## 2024-11-26 RX ORDER — INSULIN ASPART 100 [IU]/ML
0-5 INJECTION, SOLUTION INTRAVENOUS; SUBCUTANEOUS
Status: DISCONTINUED | OUTPATIENT
Start: 2024-11-26 | End: 2024-11-30 | Stop reason: HOSPADM

## 2024-11-26 RX ORDER — ENOXAPARIN SODIUM 100 MG/ML
40 INJECTION SUBCUTANEOUS EVERY 24 HOURS
Status: DISCONTINUED | OUTPATIENT
Start: 2024-11-26 | End: 2024-11-30 | Stop reason: HOSPADM

## 2024-11-26 RX ORDER — GLUCAGON 1 MG
1 KIT INJECTION
Status: DISCONTINUED | OUTPATIENT
Start: 2024-11-26 | End: 2024-11-30 | Stop reason: HOSPADM

## 2024-11-26 RX ORDER — HYDRALAZINE HYDROCHLORIDE 20 MG/ML
10 INJECTION INTRAMUSCULAR; INTRAVENOUS EVERY 4 HOURS PRN
Status: DISCONTINUED | OUTPATIENT
Start: 2024-11-26 | End: 2024-11-30 | Stop reason: HOSPADM

## 2024-11-26 RX ADMIN — DEXTROSE MONOHYDRATE 24 MILLION UNITS: 50 INJECTION, SOLUTION INTRAVENOUS at 08:11

## 2024-11-26 RX ADMIN — ENOXAPARIN SODIUM 40 MG: 40 INJECTION SUBCUTANEOUS at 07:11

## 2024-11-26 RX ADMIN — MORPHINE SULFATE 2 MG: 2 INJECTION, SOLUTION INTRAMUSCULAR; INTRAVENOUS at 06:11

## 2024-11-26 NOTE — TELEPHONE ENCOUNTER
Mr. Vu clinic with complaints of new onset of blurry vision.  He did have 1 new sexual partner 2 weeks ago however he reports only using his hands and did not have oral or anal sex.  He denies any fevers, chills, or night sweats.  He denies any headaches.  He does report having a sore throat and no cough.  He reports he did see an eye doctor in [place] 1 week ago and he did say that he had some inflammation, however did not believe it to be ocular syphilis at that time.  Unsure of what testing was completed.    He has had a history of ocular syphilis 2 years ago and since then he has received 3 courses of IV penicillin 1st in 12/2022, then in 06/2023, and then again in 05/2024.  His RPR on 08/08/2024 was 1:64 as well as on 09/12/2024.    RPR was rechecked yesterday in his now increased to 1:128.  Given new onset of blurry vision and new partner would like to treat for neurosyphilis.  I advised him to present to ER to obtain lumbar puncture and to begin treatment with penicillin IV.  He is in agreement with the plan of care and will present to the ER. ID is happy to see him as a consulting service.

## 2024-11-26 NOTE — CONSULTS
"Consultation Report  Ophthalmology Service    Date: 11/26/2024    Reason for Consult: "concern for ocular syphilis"     History of Present Illness: Chance Vu is a 54 y.o. male with hx of syphilis and blurry vision and treatment x 3 for ocular syphilis who presented to [facility] for concern for ocular syphilis. Ophthalmology is being consulted to evaluate. PT reports blurry vision and ocular discomfort. Reports he was seen by retina specialist in [place] on Friday who started him on PF 6x a day and Ketorolac BID OU.     POcularHx: CEIOL OU. Ocular syphilis.    Current eye gtts: PF 6x a day OU. Ketorolac BID OU     Family Hx: family history includes Diabetes in his father.     PMHx:  has a past medical history of Diabetes mellitus, type 2, Hyperlipidemia, Hypertension, and Syphilis, unspecified.     PSurgHx:  has a past surgical history that includes Circumcision.     Home Medications:   Prior to Admission medications    Medication Sig Start Date End Date Taking? Authorizing Provider   atorvastatin (LIPITOR) 20 MG tablet Take 1 tablet (20 mg total) by mouth once daily. 9/10/24 12/9/24  Pinky Cr MD   bromfenac (PROLENSA) 0.07 % Drop     Provider, Historical   losartan (COZAAR) 50 MG tablet Take 1 tablet (50 mg total) by mouth once daily. 9/10/24 12/9/24  Pinky Cr MD   metFORMIN (GLUCOPHAGE) 850 MG tablet Take 1 tablet (850 mg total) by mouth daily with breakfast. 9/10/24 12/9/24  Pinky Cr MD   prednisoLONE acetate (PRED FORTE) 1 % DrpS INSTILL 1 DROP INTO BOTH EYES SIX TIMES A DAY    Provider, Historical        Medications this encounter:     Allergies: has No Known Allergies.     Social:  reports that he has never smoked. He has never used smokeless tobacco. He reports that he does not drink alcohol and does not use drugs.     ROS: As per HPI    Ocular examination/Dilated fundus examination:  Base Eye Exam       Visual Acuity (Snellen - Linear)         Right Left    Dist " sc 20/50 20/70    Dist ph sc 20/30 ni              Tonometry (Tonopen, 4:40 PM)         Right Left    Pressure 11 11              Pupils         Pupils Dark Light Shape React APD    Right PERRL 5 3 Round Brisk None    Left PERRL 5 3 Round Brisk None              Visual Fields         Right Left     Full Full              Extraocular Movement         Right Left     Full, Ortho Full, Ortho              Dilation       Both eyes: 1% Mydriacyl, 2.5% Phenylephrine @ 4:40 PM                  Slit Lamp and Fundus Exam       External Exam         Right Left    External Normal Normal              Slit Lamp Exam         Right Left    Lids/Lashes Normal Normal    Conjunctiva/Sclera White and quiet White and quiet    Cornea CCI x 2 and inf LRI CCI x 2 and IT LRI    Anterior Chamber 1-2+ cell and flair 1-2+ cell and flair    Iris Round and reactive Round and reactive    Lens multifocal IOL multifocal IOL    Anterior Vitreous vitritis, ribbons and snowballs in vitreous vitritis, ribbons and snowballs in vitreous              Fundus Exam         Right Left    Disc Sharp and pink Sharp and pink    C/D Ratio 0.3 0.3    Macula flat flat. chorioretinal lesions appear to stop at arcades.    Vessels Normal Normal    Periphery Flat, attached, no H/T/L chorioretinal lesions, round, pigmetned and 360 degrees.                      Assessment/Plan:     1. 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 OS with multiple circular retinochoroidal lesions.   - Exam highly suspicious for Ocular syphilis. Given increase in RPR recently, agree with tx for neurosyphilis     Recommendations  - Treat as neurosyphilis per ID/IM (IV penicillin and LP)  - continue Pred Forte 6 times a day  - start cyclopentolate 1% TID  - stop Ketoralac  BID  - Labs for TB (quant gold), Sarcoid (ace, lysozyme, CTX), HIV 1/2, other STD testing, HLA-A29,  HLA-B27, HLA-DR5, HLA-B51  - Can fu in our clinic once discharged or back with his usual eye doctor near his home in 2 weeks.   - please call if new or worsening sxs develop      Doe Hughes MD   [employer] Ophthalmology PGY-3  11/26/2024  4:15 PM        Common Ophthalmologic Abbreviations  OD: right eye  OS: left eye  OU: both eyes  IOP: intraocular pressure  VA: visual acuity  PH: pinhole  HM: hand motion  LP: light perception  NLP: no light perception  DFE: dilated fundus examination  SLE: slit lamp examination  RD: retinal detachment   AT: artificial tears  PFAT: preservative free artificial tears

## 2024-11-26 NOTE — ED PROVIDER NOTES
Encounter Date: 11/26/2024     History     Chief Complaint   Patient presents with    abnormal labs     C/o elevated rpr . Spoke with ID   and was told to come to ER to be evaluated due to hx of ocular syphilis     Patient with pmhx of HTN, DM, HLD, and ocular syphilis presents today for further evaluation and treatment of ocular syphilis. Patient says he was treated in [place] several years ago. About 2 weeks ago he had a new male sexual partner and says after this he started to notice a decline in his vision. He made an appt with an ophthalmologist in [place] and was told he had exam findings concerning for ocular syphilis. Patient then says he came to [place] to have blood work done with ID and results were concerning for new infection. Patient was advised to go to the ED for admission.     The history is provided by the patient. No  was used.     Review of patient's allergies indicates:  No Known Allergies  Past Medical History:   Diagnosis Date    Diabetes mellitus, type 2     Hyperlipidemia     Hypertension     Syphilis, unspecified      Past Surgical History:   Procedure Laterality Date    CIRCUMCISION       Family History   Problem Relation Name Age of Onset    Diabetes Father N/a      Social History     Tobacco Use    Smoking status: Never    Smokeless tobacco: Never   Substance Use Topics    Alcohol use: Never    Drug use: Never     Review of Systems   Constitutional:  Negative for chills and fever.   Eyes:  Positive for visual disturbance. Negative for photophobia, pain, discharge, redness and itching.   Respiratory:  Negative for cough and shortness of breath.    Cardiovascular:  Negative for chest pain.   Gastrointestinal:  Negative for abdominal pain, constipation, diarrhea, nausea and vomiting.   Genitourinary:  Negative for dysuria, flank pain and hematuria.   Musculoskeletal:  Negative for arthralgias and myalgias.   Skin:  Negative for rash.   Neurological:  Negative for  syncope, light-headedness and headaches.   All other systems reviewed and are negative.      Physical Exam     Initial Vitals [11/26/24 1430]   BP Pulse Resp Temp SpO2   135/87 (!) 113 20 98.1 °F (36.7 °C) 96 %      MAP       --         Physical Exam    Vitals reviewed.  Constitutional: He is not diaphoretic. No distress.   HENT:   Head: Normocephalic and atraumatic.   Right Ear: External ear normal.   Left Ear: External ear normal. Mouth/Throat: Oropharynx is clear and moist. No oropharyngeal exudate.   Eyes: Conjunctivae and EOM are normal. Pupils are equal, round, and reactive to light.   Neck: Neck supple.   Cardiovascular:  Normal rate, regular rhythm, normal heart sounds and intact distal pulses.           Pulmonary/Chest: Breath sounds normal. No respiratory distress.   Abdominal: Abdomen is soft. He exhibits no distension.   Musculoskeletal:         General: No edema.      Cervical back: Neck supple.     Neurological: He is alert and oriented to person, place, and time. GCS score is 15. GCS eye subscore is 4. GCS verbal subscore is 5. GCS motor subscore is 6.   Skin: Skin is warm and dry. Capillary refill takes less than 2 seconds. No rash noted.   Psychiatric: He has a normal mood and affect.         ED Course   Procedures  Labs Reviewed - No data to display       Imaging Results    None          Medications - No data to display  Medical Decision Making  Ddx: ocular syphilis, AMD, cataracts, amongst others    Amount and/or Complexity of Data Reviewed  External Data Reviewed: labs and notes.  Discussion of management or test interpretation with external provider(s): Case discussed with Dr. Juarez                                 Clinical Impression:  Final diagnoses:  [A52.3] Neurosyphilis (Primary)      ED Disposition Condition    Admit Stable                Maryuri Sheikh PA  11/26/24 2016

## 2024-11-27 LAB
ALBUMIN SERPL-MCNC: 4 G/DL (ref 3.5–5)
ALBUMIN/GLOB SERPL: 1.3 RATIO (ref 1.1–2)
ALP SERPL-CCNC: 64 UNIT/L (ref 40–150)
ALT SERPL-CCNC: 25 UNIT/L (ref 0–55)
ANION GAP SERPL CALC-SCNC: 6 MEQ/L
AST SERPL-CCNC: 15 UNIT/L (ref 5–34)
BASOPHILS # BLD AUTO: 0.03 X10(3)/MCL
BASOPHILS NFR BLD AUTO: 0.4 %
BILIRUB SERPL-MCNC: 0.4 MG/DL
BUN SERPL-MCNC: 15.2 MG/DL (ref 8.4–25.7)
C TRACH DNA SPEC QL NAA+PROBE: NOT DETECTED
CALCIUM SERPL-MCNC: 9.1 MG/DL (ref 8.4–10.2)
CHLORIDE SERPL-SCNC: 106 MMOL/L (ref 98–107)
CO2 SERPL-SCNC: 26 MMOL/L (ref 22–29)
CREAT SERPL-MCNC: 0.92 MG/DL (ref 0.72–1.25)
CREAT/UREA NIT SERPL: 17
EOSINOPHIL # BLD AUTO: 0.06 X10(3)/MCL (ref 0–0.9)
EOSINOPHIL NFR BLD AUTO: 0.8 %
ERYTHROCYTE [DISTWIDTH] IN BLOOD BY AUTOMATED COUNT: 12.3 % (ref 11.5–17)
GFR SERPLBLD CREATININE-BSD FMLA CKD-EPI: >60 ML/MIN/1.73/M2
GLOBULIN SER-MCNC: 3 GM/DL (ref 2.4–3.5)
GLUCOSE SERPL-MCNC: 232 MG/DL (ref 74–100)
HCT VFR BLD AUTO: 43.3 % (ref 42–52)
HGB BLD-MCNC: 14.9 G/DL (ref 14–18)
HOLD SPECIMEN: NORMAL
IMM GRANULOCYTES # BLD AUTO: 0.11 X10(3)/MCL (ref 0–0.04)
IMM GRANULOCYTES NFR BLD AUTO: 1.4 %
LYMPHOCYTES # BLD AUTO: 2.36 X10(3)/MCL (ref 0.6–4.6)
LYMPHOCYTES NFR BLD AUTO: 30.1 %
MAGNESIUM SERPL-MCNC: 2.2 MG/DL (ref 1.6–2.6)
MCH RBC QN AUTO: 31 PG (ref 27–31)
MCHC RBC AUTO-ENTMCNC: 34.4 G/DL (ref 33–36)
MCV RBC AUTO: 90 FL (ref 80–94)
MONOCYTES # BLD AUTO: 0.74 X10(3)/MCL (ref 0.1–1.3)
MONOCYTES NFR BLD AUTO: 9.5 %
N GONORRHOEA DNA SPEC QL NAA+PROBE: NOT DETECTED
NEUTROPHILS # BLD AUTO: 4.53 X10(3)/MCL (ref 2.1–9.2)
NEUTROPHILS NFR BLD AUTO: 57.8 %
NRBC BLD AUTO-RTO: 0 %
PHOSPHATE SERPL-MCNC: 2.6 MG/DL (ref 2.3–4.7)
PLATELET # BLD AUTO: 198 X10(3)/MCL (ref 130–400)
PMV BLD AUTO: 11.3 FL (ref 7.4–10.4)
POCT GLUCOSE: 148 MG/DL (ref 70–110)
POCT GLUCOSE: 154 MG/DL (ref 70–110)
POCT GLUCOSE: 168 MG/DL (ref 70–110)
POCT GLUCOSE: 181 MG/DL (ref 70–110)
POCT GLUCOSE: 204 MG/DL (ref 70–110)
POTASSIUM SERPL-SCNC: 4.5 MMOL/L (ref 3.5–5.1)
PROT SERPL-MCNC: 7 GM/DL (ref 6.4–8.3)
RBC # BLD AUTO: 4.81 X10(6)/MCL (ref 4.7–6.1)
SODIUM SERPL-SCNC: 138 MMOL/L (ref 136–145)
SOURCE (OHS): NORMAL
WBC # BLD AUTO: 7.83 X10(3)/MCL (ref 4.5–11.5)

## 2024-11-27 PROCEDURE — 25000003 PHARM REV CODE 250

## 2024-11-27 PROCEDURE — 85549 MURAMIDASE: CPT

## 2024-11-27 PROCEDURE — 11000001 HC ACUTE MED/SURG PRIVATE ROOM

## 2024-11-27 PROCEDURE — 86812 HLA TYPING A B OR C: CPT

## 2024-11-27 PROCEDURE — 87491 CHLMYD TRACH DNA AMP PROBE: CPT

## 2024-11-27 PROCEDURE — 85025 COMPLETE CBC W/AUTO DIFF WBC: CPT

## 2024-11-27 PROCEDURE — 82164 ANGIOTENSIN I ENZYME TEST: CPT

## 2024-11-27 PROCEDURE — 82523 COLLAGEN CROSSLINKS: CPT

## 2024-11-27 PROCEDURE — 36415 COLL VENOUS BLD VENIPUNCTURE: CPT

## 2024-11-27 PROCEDURE — 63600175 PHARM REV CODE 636 W HCPCS

## 2024-11-27 PROCEDURE — 83735 ASSAY OF MAGNESIUM: CPT

## 2024-11-27 PROCEDURE — 36415 COLL VENOUS BLD VENIPUNCTURE: CPT | Mod: 91

## 2024-11-27 PROCEDURE — 36415 COLL VENOUS BLD VENIPUNCTURE: CPT | Performed by: INTERNAL MEDICINE

## 2024-11-27 PROCEDURE — 86480 TB TEST CELL IMMUN MEASURE: CPT

## 2024-11-27 PROCEDURE — 84100 ASSAY OF PHOSPHORUS: CPT

## 2024-11-27 PROCEDURE — 80053 COMPREHEN METABOLIC PANEL: CPT

## 2024-11-27 RX ADMIN — CYCLOPENTOLATE HYDROCHLORIDE 1 DROP: 10 SOLUTION/ DROPS OPHTHALMIC at 08:11

## 2024-11-27 RX ADMIN — PREDNISOLONE ACETATE 1 DROP: 10 SUSPENSION/ DROPS OPHTHALMIC at 02:11

## 2024-11-27 RX ADMIN — CYCLOPENTOLATE HYDROCHLORIDE 1 DROP: 10 SOLUTION/ DROPS OPHTHALMIC at 03:11

## 2024-11-27 RX ADMIN — PREDNISOLONE ACETATE 1 DROP: 10 SUSPENSION/ DROPS OPHTHALMIC at 09:11

## 2024-11-27 RX ADMIN — DEXTROSE MONOHYDRATE 24 MILLION UNITS: 50 INJECTION, SOLUTION INTRAVENOUS at 08:11

## 2024-11-27 RX ADMIN — PREDNISOLONE ACETATE 1 DROP: 10 SUSPENSION/ DROPS OPHTHALMIC at 10:11

## 2024-11-27 RX ADMIN — CYCLOPENTOLATE HYDROCHLORIDE 1 DROP: 10 SOLUTION/ DROPS OPHTHALMIC at 10:11

## 2024-11-27 RX ADMIN — PREDNISOLONE ACETATE 1 DROP: 10 SUSPENSION/ DROPS OPHTHALMIC at 05:11

## 2024-11-27 RX ADMIN — ATORVASTATIN CALCIUM 20 MG: 20 TABLET, FILM COATED ORAL at 10:11

## 2024-11-27 RX ADMIN — ENOXAPARIN SODIUM 40 MG: 40 INJECTION SUBCUTANEOUS at 05:11

## 2024-11-27 RX ADMIN — PREDNISOLONE ACETATE 1 DROP: 10 SUSPENSION/ DROPS OPHTHALMIC at 01:11

## 2024-11-27 NOTE — PLAN OF CARE
11/27/24 1045   Discharge Assessment   Assessment Type Discharge Planning Assessment   Confirmed/corrected address, phone number and insurance Yes   Confirmed Demographics Correct on Facesheet   Source of Information other (see comments)  (Pt not on unit at this time.)

## 2024-11-27 NOTE — PLAN OF CARE
Inpatient consult to Infectious Diseases  Consult performed by: Vignesh Linton MD  Consult ordered by: Ashutosh Mccallum DO  Reason for consult: Neurosyphilis

## 2024-11-27 NOTE — CONSULTS
Referral submitted & accepted by  [facility] via [employer].

## 2024-11-27 NOTE — PLAN OF CARE
[facility]  Infectious Diseases OPAT Orders  2024     PATIENT: Chance Vu  :          1970   MRN:         [id number]     DIAGNOSIS: Ocular syphilis    Review of patient's allergies indicates:  No Known Allergies     WEIGHT:   CURRENT: Estimated Creatinine Clearance: 74.7 mL/min (based on SCr of 0.92 mg/dL).     MEDICATIONS:   1) Penicillin G-K 24 million units IV q24h, given as continuous infusion  - Duration: 14 days  - End date: 24      **PLEASE FAX LAB RESULTS TO [phone]**  LABS:     Please check the following labs weekly x 2weeks: CBC and CMP    ** LABS are NOT to be drawn from PICC site**      NURSING / OTHER:     [x] Please place PICC line  [x] Routine PICC line care with weekly PICC line dressing changes  [x] Patient to receive first dose of IV antimicrobials in a supervised setting  [x] Please provide home IV antimicrobial teaching  [x] OK to pull PICC line after completion of IV antimicrobial therapy    ADDITIONAL NOTES:       Mily Victoria FNP / Dr Vignesh Linton  [facility] Infectious Diseases

## 2024-11-27 NOTE — PROCEDURES
"Chance Vu is a 54 y.o. male patient.    Temp: 98 °F (36.7 °C) (11/26/24 2022)  Pulse: 78 (11/26/24 2022)  Resp: 20 (11/26/24 2022)  BP: 132/89 (11/26/24 2022)  SpO2: 99 % (11/26/24 2022)  Weight: 57.5 kg (126 lb 12.2 oz) (11/26/24 1500)  Height: 5' 3" (160 cm) (11/26/24 1500)       Lumbar Puncture    Date/Time: 11/26/2024 10:14 PM  Location procedure was performed: [facility] MEDICINE    Performed by: Ashutosh Mccallum DO  Authorized by: Ashutosh Mccallum DO  Assisting provider: Esther Jeffries MD  Pre-operative diagnosis:  Ocular syphilis  Post-operative diagnosis: ocular syphilis  Consent Done: Yes  Indications: evaluation for infection  Anesthesia: local infiltration    Anesthesia:  Local Anesthetic: lidocaine 1% without epinephrine  Description of findings: Failed lumbar puncture   Preparation: Patient was prepped and draped in the usual sterile fashion.  Lumbar space: L4-L5 interspace  Patient's position: left lateral decubitus  Needle gauge: 18  Needle type: spinal needle - Quincke tip  Needle length: 3.5 in  Number of attempts: 2  Post-procedure: site cleaned and adhesive bandage applied  Complications: No  Specimens: No  Implants: No  Comments: CSF space was reached, however, flow was too slow to obtain any samples. Procedure was aborted following failure of adequate CSF drainage.          11/26/2024    "

## 2024-11-27 NOTE — H&P
[facility] Medicine Wards History & Physical Note     Resident Team: [facility] Medicine List 3  Attending Physician: Nisha Tony MD  Resident: DO Xena  Intern: MD Nesha     Date of Admit: 11/26/2024    Chief Complaint     abnormal labs (C/o elevated rpr . Spoke with ID  dr and was told to come to ER to be evaluated due to hx of ocular syphilis)      Subjective:      History of Present Illness:  Chance Vu is a 54 y.o. male with PMH of DM 2, HTN, HLD, and ocular syphilis (reportedly treated x3 times previously) who presented to the ED on 11/26/24 after his recent blood work in the ID clinic showed elevated RPR levels of 1:128. Patient complaints of visual blurriness having increased since 2 weeks after having had a new sexual partner; unknown STD status of partner. He also confirms B/L photosensitivity and flashing lights in his R eye at baseline. He made an appointment with an ophthalmologist in [place], where he lives, and was told he had exam findings concerning for inflammation and ocular syphilis. Patient says he came to [place] on Sunday to have blood work done. Denies any fevers, chills, rashes, genital ulcers, night sweats, sore throat, chest pain, SOB, cough, nausea, vomiting, diarrhea, constipation, headaches, dizziness or any other complaints. Patient reports recently having undergone cataract surgery. He has had a history of ocular syphilis since 2 years ago and has received 3 courses of IV penicillin - 1st in 01/2023, then in 04/2023, and then again in 04/2024. Every course of antibiotics has been driven by his visual symptoms and his RPR. His RPR reportedly was 1:128 initially, dropped to 1:32 then back to 1:64 and again dropped to 1:32 and has reportedly stayed there. Sexually active with multiple male partners, reports more than 5 in the past year - firmly denies any anal or penetrative intercourse over the past 6 months since last treatment, mentions strict use of hands only. Patient mentions having  experienced a blister on his R index finger post previous encounter that has now healed. Denies smoking, alcohol or drug use. He follows with retina specialist Dr. Jocelyne Peña in [place] as well as Dr. Joe Munoz with Infectious disease in [place]. He has provided contact details of the latter for the primary team to be able to contact him regarding previous treatments.     In the ED, patient was afebrile, /87, pulse 113 bpm, RR 20, SpO2 96%. CBC and CMP wnl at ID clinic yesterday. IM was consulted for lumbar puncture and to begin treatment with IV penicillin.     Interval history  NAEON. VSS. Patient mentions slightly improved visual blurriness, mentions it is hard to gauge while in the room. Denies any fevers, chills, nausea, vomiting, diarrhea, constipation, headaches, dizziness or any other complaints overnight.          Past Medical History:  Past Medical History:   Diagnosis Date    Diabetes mellitus, type 2     Hyperlipidemia     Hypertension     Syphilis, unspecified        Past Surgical History:  Past Surgical History:   Procedure Laterality Date    CIRCUMCISION         Family History:  Family History   Problem Relation Name Age of Onset    Diabetes Father N/a        Social History:  Social History     Tobacco Use    Smoking status: Never    Smokeless tobacco: Never   Substance Use Topics    Alcohol use: Never    Drug use: Never       Allergies:  Review of patient's allergies indicates:  No Known Allergies    Home Medications:  Prior to Admission medications    Medication Sig Start Date End Date Taking? Authorizing Provider   atorvastatin (LIPITOR) 20 MG tablet Take 1 tablet (20 mg total) by mouth once daily. 9/10/24 12/9/24  Pinky Cr MD   bromfenac (PROLENSA) 0.07 % Drop     Provider, Historical   losartan (COZAAR) 50 MG tablet Take 1 tablet (50 mg total) by mouth once daily. 9/10/24 12/9/24  Pinky Cr MD   metFORMIN (GLUCOPHAGE) 850 MG tablet Take 1 tablet (850 mg  total) by mouth daily with breakfast. 9/10/24 12/9/24  Pinky Cr MD   prednisoLONE acetate (PRED FORTE) 1 % DrpS INSTILL 1 DROP INTO BOTH EYES SIX TIMES A DAY    Provider, Historical         Review of Systems:  .  A 12 points ROS was performed and is negative unless specified in HPI         Objective:   Last 24 Hour Vital Signs:  BP  Min: 118/77  Max: 144/91  Temp  Av.7 °F (36.5 °C)  Min: 97.4 °F (36.3 °C)  Max: 98 °F (36.7 °C)  Pulse  Av.1  Min: 61  Max: 180  Resp  Av.4  Min: 15  Max: 20  SpO2  Av.1 %  Min: 92 %  Max: 100 %  Body mass index is 22.46 kg/m².  I/O last 3 completed shifts:  In: 240 [P.O.:240]  Out: -     Physical Examination:  General: Nontoxic-appearing, well nourished, in no acute distress  Eye: PERRL, EOMI. Visual acuity 20/50 on Snellen's chart  HENT: Normocephalic, atraumatic, moist mucous membranes  Neck: Supple, nontender, no JVD  Respiratory: Clear to auscultation bilaterally, no wheezes, rales, or rhonchi. Normal work of breathing  Cardiovascular: Regular rate and rhythm, no murmur, rubs, or gallops. No peripheral edema. 2+ radial pulses  Gastrointestinal: Soft, nontender, nondistended  Musculoskeletal: Moves all extremities purposefully. No gross deformities. No calf tenderness  Integumentary: Warm, dry, intact. No rashes.  Neurologic: Alert and oriented x3. Normal speech. No gross deficits  Psychiatric: Appropriate mood and affect            Laboratory:  Most Recent Data:  CBC:   Lab Results   Component Value Date    WBC 7.83 2024    HGB 14.9 2024    HCT 43.3 2024     2024    MCV 90.0 2024    RDW 12.3 2024     WBC Differential:   Recent Labs   Lab 24  0859 24  0332   WBC 6.84 7.83   HGB 15.5 14.9   HCT 46.4 43.3    198   MCV 90.6 90.0     BMP:   Lab Results   Component Value Date     2024    K 4.5 2024     2024    CO2 26 2024    BUN 15.2 2024    CREATININE  "0.92 11/27/2024    CALCIUM 9.1 11/27/2024    MG 2.20 11/27/2024    PHOS 2.6 11/27/2024     LFTs:   Lab Results   Component Value Date    ALBUMIN 4.0 11/27/2024    BILITOT 0.4 11/27/2024    AST 15 11/27/2024    ALKPHOS 64 11/27/2024    ALT 25 11/27/2024     Coags: No results found for: "INR", "PROTIME", "PTT"  FLP:   Lab Results   Component Value Date    CHOL 157 11/26/2024    HDL 39 11/26/2024    LDLCALC 91 06/12/2024    TRIG 252 (H) 11/26/2024     DM:   Lab Results   Component Value Date    HGBA1C 6.2 11/26/2024    HGBA1C 6.3 09/10/2024    HGBA1C 7.1 (A) 06/12/2024    MICROALBUR 10.0 06/12/2024    LDLCALC 91 06/12/2024    CREATININE 0.92 11/27/2024     Thyroid: No results found for: "TSH", "FREET4", "X3JEBWF", "Q7ETALA", "THYROIDAB"  Anemia: No results found for: "IRON", "TIBC", "FERRITIN", "[id number]", "FOLATE"  Cardiac: No results found for: "TROPONINI", "CKTOTAL", "CKMB", "BNP"  Urinalysis:   Lab Results   Component Value Date    COLORU Colorless 09/10/2024    NITRITE Negative 09/10/2024    UROBILINOGEN Normal 09/10/2024    WBCUA None Seen 09/10/2024       Trended Lab Data:  Recent Labs   Lab 11/25/24  0859 11/27/24  0331 11/27/24  0332   WBC 6.84  --  7.83   HGB 15.5  --  14.9   HCT 46.4  --  43.3     --  198   MCV 90.6  --  90.0   RDW 12.6  --  12.3    138  --    K 4.3 4.5  --     106  --    CO2 27 26  --    BUN 12.0 15.2  --    CREATININE 0.82 0.92  --    ALBUMIN 4.7 4.0  --    BILITOT 0.8 0.4  --    AST 19 15  --    ALKPHOS 73 64  --    ALT 31 25  --        Trended Cardiac Data:  No results for input(s): "TROPONINI", "CKTOTAL", "CKMB", "BNP" in the last 168 hours.      Radiology:  Imaging Results    None            Assessment & Plan:     Visual blurriness  Neurosyphilis  Hx of ocular syphilis (Dx 12/2022 - treated x3 times)  - Multiple male sexual partners  - Syphilis antibody positive. RPR increased to 1:128 from 1:64. Previous trend in HPI  - Quant gold,  HIV 1/2 Ag/Ab, chlamydia, " gonorrhea negative 8/8/24.  - Has previously received 3 courses of IV penicillin - 12/2022 (RPR 1:32), after RPR found 1:64 2nd treatment in 06/2023 (RPR 1:32), and third time in 04/2024 due to worsening visual symptoms.  - LP was attempted but was not successful due to fluid draining too slowly  - IR re-attempted LP this am, obtained dry tap.  - Patient started on IV penicillin G 24 million units D5W 500 mL continuous infusion  - Ophthalmology consulted; appreciate their assistance.   - Per their recommendations:  Treat as neurosyphilis  Continue Pred Forte x6/day  Start cyclopentolate 1% TID   Stop Ketoralac BID   Labs for TB (quant gold), Sarcoid (ace, lysozyme, CTX), HIV 1/2, other STD testing, HLA-A29,  HLA-B27, HLA-DR5, HLA-B51 pending  - ID consulted; appreciate their assistance. Per their recommendations:   Recommend repeat LP   Continue Penicillin 24 million units IV q24h for 14 days, despite LP results due to symptoms    GC/chlamydia oral swab and rectal swab, HIV Ab pending              Consulted CM to obtain full syphilis treatment records, and LP results (3/2024) from [facility] in [place].    Okay to place PICC and consult CM for OPAT (consult ordered)   After current treatment is completed, recommend Bicillin 2.4 million units IM x3 qweekly.  Will need ID follow up in 3 months to recheck RPR. Patient has appt scheduled in [place] in 1/2025.  DM 2  - Last HbA1c 6.5 1 year ago  - Low dose SSI in place    HTN  - BP max in 's/90's  - PRNs in place with parameters    HLD  - Lipid panel 06/12/24 wnl  - Repeat pending  - Continue home atorvastatin 20 mg daily    CODE STATUS: Full code  Access: PIV  Antibiotics: IV penicillin G  Diet: Heart healthy 2000 calories  DVT Prophylaxis: enoxaparin   GI Prophylaxis: -  Fluids: -      Disposition: Patient admitted to IM for LP and penicillin treatment requiring neuro/ocular syphilis. Started IV penicillin G 24 million units infusion. Dispo pending  treatment plan and response.    Esther Jeffries MD  Internal Medicine - PGY-1

## 2024-11-27 NOTE — H&P
[facility] Medicine Wards History & Physical Note     Resident Team: [facility] Medicine List 3  Attending Physician: Nisha Tony MD  Resident: DO Xena  Intern: MD Nesha     Date of Admit: 11/26/2024    Chief Complaint     abnormal labs (C/o elevated rpr . Spoke with ID  dr and was told to come to ER to be evaluated due to hx of ocular syphilis)      Subjective:      History of Present Illness:  Chanec Vu is a 54 y.o. male with PMH of DM 2, HTN, HLD, and ocular syphilis (reportedly treated x3 times previously) who presented to the ED on 11/26/24 after his recent blood work in the ID clinic showed elevated RPR levels of 1:128. Patient complaints of visual blurriness having increased since 2 weeks after having had a new sexual partner; unknown STD status of partner. He also confirms B/L photosensitivity and flashing lights in his R eye at baseline. He made an appointment with an ophthalmologist in [place], where he lives, and was told he had exam findings concerning for inflammation and ocular syphilis. Patient says he came to [place] on Sunday to have blood work done. Denies any fevers, chills, rashes, genital ulcers, night sweats, sore throat, chest pain, SOB, cough, nausea, vomiting, diarrhea, constipation, headaches, dizziness or any other complaints. Patient reports recently having undergone cataract surgery. He has had a history of ocular syphilis since 2 years ago and has received 3 courses of IV penicillin - 1st in 01/2023, then in 04/2023, and then again in 04/2024. Every course of antibiotics has been driven by his visual symptoms and his RPR. His RPR reportedly was 1:128 initially, dropped to 1:32 then back to 1:64 and again dropped to 1:32 and has reportedly stayed there. Sexually active with multiple male partners, reports more than 5 in the past year - firmly denies any anal or penetrative intercourse over the past 6 months since last treatment, mentions strict use of hands only. Patient mentions having  experienced a blister on his R index finger post previous encounter that has now healed. Denies smoking, alcohol or drug use. He follows with retina specialist Dr. Jocelyne Peña in [place] as well as Dr. Joe Munoz with Infectious disease in [place]. He has provided contact details of the latter for the primary team to be able to contact him regarding previous treatments.     In the ED, patient was afebrile, /87, pulse 113 bpm, RR 20, SpO2 96%. CBC and CMP wnl at ID clinic yesterday. IM was consulted for lumbar puncture and to begin treatment with IV penicillin.            Past Medical History:  Past Medical History:   Diagnosis Date    Diabetes mellitus, type 2     Hyperlipidemia     Hypertension     Syphilis, unspecified        Past Surgical History:  Past Surgical History:   Procedure Laterality Date    CIRCUMCISION         Family History:  Family History   Problem Relation Name Age of Onset    Diabetes Father N/a        Social History:  Social History     Tobacco Use    Smoking status: Never    Smokeless tobacco: Never   Substance Use Topics    Alcohol use: Never    Drug use: Never       Allergies:  Review of patient's allergies indicates:  No Known Allergies    Home Medications:  Prior to Admission medications    Medication Sig Start Date End Date Taking? Authorizing Provider   atorvastatin (LIPITOR) 20 MG tablet Take 1 tablet (20 mg total) by mouth once daily. 9/10/24 12/9/24  Pinky Cr MD   bromfenac (PROLENSA) 0.07 % Drop     Provider, Historical   losartan (COZAAR) 50 MG tablet Take 1 tablet (50 mg total) by mouth once daily. 9/10/24 12/9/24  Pinky Cr MD   metFORMIN (GLUCOPHAGE) 850 MG tablet Take 1 tablet (850 mg total) by mouth daily with breakfast. 9/10/24 12/9/24  Pinky Cr MD   prednisoLONE acetate (PRED FORTE) 1 % DrpS INSTILL 1 DROP INTO BOTH EYES SIX TIMES A DAY    Provider, Historical         Review of Systems:  .  A 12 points ROS was performed and is  "negative unless specified in HPI         Objective:   Last 24 Hour Vital Signs:  BP  Min: 115/87  Max: 144/91  Temp  Av.1 °F (36.7 °C)  Min: 98 °F (36.7 °C)  Max: 98.1 °F (36.7 °C)  Pulse  Av.4  Min: 70  Max: 114  Resp  Av.5  Min: 15  Max: 20  SpO2  Av.4 %  Min: 96 %  Max: 100 %  Height  Av' 3" (160 cm)  Min: 5' 3" (160 cm)  Max: 5' 3" (160 cm)  Weight  Av.5 kg (126 lb 12.2 oz)  Min: 57.5 kg (126 lb 12.2 oz)  Max: 57.5 kg (126 lb 12.2 oz)  Body mass index is 22.46 kg/m².  No intake/output data recorded.    Physical Examination:  General: Nontoxic-appearing, well nourished, in no acute distress  Eye: PERRL, EOMI. Visual acuity 20/50 on Snellen's chart  HENT: Normocephalic, atraumatic, moist mucous membranes  Neck: Supple, nontender, no JVD  Respiratory: Clear to auscultation bilaterally, no wheezes, rales, or rhonchi. Normal work of breathing  Cardiovascular: Regular rate and rhythm, no murmur, rubs, or gallops. No peripheral edema. 2+ radial pulses  Gastrointestinal: Soft, nontender, nondistended  Musculoskeletal: Moves all extremities purposefully. No gross deformities. No calf tenderness  Integumentary: Warm, dry, intact. No rashes.  Neurologic: Alert and oriented x3. Normal speech. No gross deficits  Psychiatric: Appropriate mood and affect            Laboratory:  Most Recent Data:  CBC:   Lab Results   Component Value Date    WBC 6.84 2024    HGB 15.5 2024    HCT 46.4 2024     2024    MCV 90.6 2024    RDW 12.6 2024     WBC Differential:   Recent Labs   Lab 24  0859   WBC 6.84   HGB 15.5   HCT 46.4      MCV 90.6     BMP:   Lab Results   Component Value Date     2024    K 4.3 2024     2024    CO2 27 2024    BUN 12.0 2024    CREATININE 0.82 2024    CALCIUM 9.9 2024     LFTs:   Lab Results   Component Value Date    ALBUMIN 4.7 2024    BILITOT 0.8 2024    AST 19 " "11/25/2024    ALKPHOS 73 11/25/2024    ALT 31 11/25/2024     Coags: No results found for: "INR", "PROTIME", "PTT"  FLP:   Lab Results   Component Value Date    CHOL 126 09/10/2024    HDL 32 (L) 09/10/2024    LDLCALC 91 06/12/2024    TRIG 102 09/10/2024     DM:   Lab Results   Component Value Date    HGBA1C 6.3 09/10/2024    HGBA1C 7.1 (A) 06/12/2024    HGBA1C 6.1 (A) 02/28/2024    MICROALBUR 10.0 06/12/2024    LDLCALC 91 06/12/2024    CREATININE 0.82 11/25/2024     Thyroid: No results found for: "TSH", "FREET4", "S0BKIIE", "M6AFJJI", "THYROIDAB"  Anemia: No results found for: "IRON", "TIBC", "FERRITIN", "[id number]", "FOLATE"  Cardiac: No results found for: "TROPONINI", "CKTOTAL", "CKMB", "BNP"  Urinalysis:   Lab Results   Component Value Date    COLORU Colorless 09/10/2024    NITRITE Negative 09/10/2024    UROBILINOGEN Normal 09/10/2024    WBCUA None Seen 09/10/2024       Trended Lab Data:  Recent Labs   Lab 11/25/24  0859   WBC 6.84   HGB 15.5   HCT 46.4      MCV 90.6   RDW 12.6      K 4.3      CO2 27   BUN 12.0   CREATININE 0.82   ALBUMIN 4.7   BILITOT 0.8   AST 19   ALKPHOS 73   ALT 31       Trended Cardiac Data:  No results for input(s): "TROPONINI", "CKTOTAL", "CKMB", "BNP" in the last 168 hours.      Radiology:  Imaging Results    None            Assessment & Plan:     Visual blurriness  Neurosyphilis  Hx of ocular syphilis (treated x3 times)  - Multiple male sexual partners  - Syphilis antibody positive. RPR increased to 1:128 from 1:64. Previous trend in HPI  - Quant gold,  HIV 1/2 Ag/Ab, chlamydia, gonorrhea negative 8/8/24.  - Has previously received 3 courses of IV penicillin - 01/2023, 04/2023, and 04/2024   - LP was attempted but was not successful due to fluid draining too slowly  - IR consult in a.m for re-attempt  - Patient started on IV penicillin G 24 million units D5W 500 mL continuous infusion  - Ophthalmology consulted; appreciate their assistance.   - Per their " recommendations:  Treat as neurosyphilis  Continue Pred Forte x6/day  Start cyclopentolate 1% TID   Stop Ketoralac BID   Labs for TB (quant gold), Sarcoid (ace, lysozyme, CTX), HIV 1/2, other STD testing, HLA-A29,  HLA-B27, HLA-DR5, HLA-B51  - ID consult requested for am.    DM 2  - Last HbA1c 6.5 1 year ago  - Low dose SSI in place    HTN  - BP max in 's/90's  - PRNs in place with parameters    HLD  - Lipid panel 06/12/24 wnl  - Repeat pending  - Continue home atorvastatin 20 mg daily    CODE STATUS: Full code  Access: PIV  Antibiotics: IV penicillin G  Diet: Heart healthy 2000 calories  DVT Prophylaxis: enoxaparin   GI Prophylaxis: -  Fluids: -      Disposition: Patient admitted to IM for LP and penicillin treatment requiring neuro/ocular syphilis. Started IV penicillin G 24 million units infusion. Dispo pending treatment plan and response.    Esther Jeffries MD  Internal Medicine - PGY-1

## 2024-11-27 NOTE — CONSULTS
"[facility]   Inpatient Infectious Diseases Consultation    Physician requesting consultation: Nisha Tony MD  Service requesting consultation: Internal Medicine  Reason for consultation:  Neurosyphilis    Historian: Patient and Electronic Medical Record    Isolation Status: No active isolations     HPI:     Patient was a 54-year-old male with a past medical history of type 2 diabetes mellitus, hypertension, hyperlipidemia, and ocular syphilis which he reports he was treated 3 times previously in [place].  Patient initially presented to the ED on 11/26 after being sent from Infectious Disease Clinic due to elevated RPR levels at 1:128.  Patient reported that he had increased visual blurriness over the last 2 weeks along with bilateral photosensitivity and flashing light in his right eye." Patient reports a complex history of syphilis in the past.  Patient reports that the 1st incident he was diagnosed with neurosyphilis and he was treated with 14 days of penicillin.  On his 2nd episode, patient reports that he was treated with a 4 days of inpatient antibiotics and provides a history of what seems to be treatment with Rocephin for the remainder of this course.  He reports the 3rd instance was also with penicillin.  Patient reports no new oral/anal sexual partners since he completed syphilis treatment earlier this year.  However, he reports manual sexual activity with multiple new partners since this hospitalization. Patient mentions having experienced a blister on his R index finger post previous encounter that has now healed. He follows with retina specialist Dr. Jocelyne Peña in [place] as well as Dr. Joe Munoz with Infectious disease in [place].     Antibiotic / Antiviral / Antifungal history:  Penicillin G 11/26-present      Past Medical History/Past Surgical History/Social History     Past Medical History:   Diagnosis Date    Diabetes mellitus, type 2     Hyperlipidemia     " Hypertension     Syphilis, unspecified        Past Surgical History:   Procedure Laterality Date    CIRCUMCISION          FAMILY HISTORY:  family history includes Diabetes in his father.     SOCIAL HISTORY:   Social History     Socioeconomic History    Marital status: Single   Tobacco Use    Smoking status: Never    Smokeless tobacco: Never   Substance and Sexual Activity    Alcohol use: Never    Drug use: Never    Sexual activity: Not Currently     Partners: Male     Birth control/protection: None     Social Drivers of Health     Financial Resource Strain: Low Risk  (11/26/2024)    Overall Financial Resource Strain (CARDIA)     Difficulty of Paying Living Expenses: Not hard at all   Food Insecurity: No Food Insecurity (11/26/2024)    Hunger Vital Sign     Worried About Running Out of Food in the Last Year: Never true     Ran Out of Food in the Last Year: Never true   Transportation Needs: No Transportation Needs (11/26/2024)    TRANSPORTATION NEEDS     Transportation : No   Physical Activity: Insufficiently Active (9/9/2024)    Exercise Vital Sign     Days of Exercise per Week: 7 days     Minutes of Exercise per Session: 20 min   Stress: No Stress Concern Present (11/26/2024)    [employer] - Occupational Stress Questionnaire     Feeling of Stress : Not at all   Housing Stability: Low Risk  (11/26/2024)    Housing Stability Vital Sign     Unable to Pay for Housing in the Last Year: No     Homeless in the Last Year: No        ALLERGIES: Review of patient's allergies indicates:  No Known Allergies      Review of Systems     Review of Systems   Constitutional:  Negative for chills, fever and malaise/fatigue.   HENT:  Negative for hearing loss and tinnitus.    Eyes:  Positive for blurred vision and photophobia.   Respiratory: Negative.     Cardiovascular: Negative.    Gastrointestinal: Negative.    Genitourinary: Negative.    Neurological: Negative.          MEDICATIONS:   Scheduled Meds:    atorvastatin  20 mg Oral Daily    cyclopentolate 1%  1 drop Both Eyes TID    enoxparin  40 mg Subcutaneous Daily    penicillin G potassium 24 Million Units in D5W 500 mL CONTINUOUS INFUSION  24 Million Units Intravenous Q24H    prednisoLONE acetate  1 drop Both Eyes 6x Daily     Continuous Infusions:  PRN Meds:.  Current Facility-Administered Medications:     dextrose 10%, 12.5 g, Intravenous, PRN    dextrose 10%, 25 g, Intravenous, PRN    glucagon (human recombinant), 1 mg, Intramuscular, PRN    glucose, 16 g, Oral, PRN    glucose, 24 g, Oral, PRN    hydrALAZINE, 10 mg, Intravenous, Q4H PRN    insulin aspart U-100, 0-5 Units, Subcutaneous, QID (AC + HS) PRN    labetalol, 10 mg, Intravenous, Q4H PRN    naloxone, 0.02 mg, Intravenous, PRN    sodium chloride 0.9%, 10 mL, Intravenous, Q12H PRN    Physical exam:     Temp:  [97.4 °F (36.3 °C)-98.1 °F (36.7 °C)] 98 °F (36.7 °C)  Pulse:  [] 83  Resp:  [15-20] 17  SpO2:  [92 %-100 %] 100 %  BP: (115-144)/(79-96) 138/93     GENERAL: A&Ox3, NAD  HEENT: atraumatic, normocephalic, anicteric, moist oral mucosa without lesions, exudate, or erythema. Visual acuity 20/50 on Snellen's chart   LUNGS: breathing unlabored, lungs CTA bilateral  HEART: RRR; no murmur, rub, or gallop  ABDOMEN: abdomen soft, nondistended, BS noted x 4 quadrants, no tympany, no rebound, guarding, or organomegaly  : no CVA tenderness  EXTREMITIES: no edema, clubbing, or cyanosis   SKIN: no rashes or lesions  NEURO: speech fluent and intact, facial symmetry preserved, no tremor  PSYCH: cooperative, normal mood and affect    LABS:     I have personally reviewed patient's labs.  Pertinent results noted below.    CBC  Recent Labs     11/25/24  0859 11/27/24  0332   WBC 6.84 7.83   HGB 15.5 14.9   HCT 46.4 43.3    198       Differential  Recent Labs   Lab 11/27/24  0332   WBC 7.83   HGB 14.9   HCT 43.3           Basic Metabolic Panel  Recent Labs     11/25/24  0859 11/27/24  0331     138   K 4.3 4.5    106   CO2 27 26   BUN 12.0 15.2   CREATININE 0.82 0.92        Hepatic Panel  Lab Results   Component Value Date    ALT 25 11/27/2024    AST 15 11/27/2024    ALKPHOS 64 11/27/2024    BILITOT 0.4 11/27/2024        Urinalysis:  No results found for this or any previous visit.    Estimated Creatinine Clearance: 74.7 mL/min (based on SCr of 0.92 mg/dL).     ESR:  No results found for this or any previous visit.   CRP:  No results found for this or any previous visit.      MICRO AND PATHOLOGY:       IMAGING:     I have personally reviewed patient's imaging. Pertinent results noted below.  IR LUMBAR PUNCTURE DIAGNOSTIC WITH IMAGING   Final Result      Dry tap.  No CSF obtained.         Electronically signed by: Joe Bryant   Date:    11/27/2024   Time:    09:12        IMPRESSION     Suspected neurosyphilis  Previous history of ocular syphilis x3  Hypertension  Hyperlipidemia  Type 2 diabetes mellitus    RECOMMENDATIONS:    - due to high suspicion for ocular/neurosyphilis, agree with initiation of penicillin G 24 million units Q 24 hours.   - lumbar puncture unfortunately unsuccessful on initial attempt.  Recommend interventional radiology consult for lumbar puncture.  Ideally, recommend this be completed today if possible.  Please obtain protein, glucose, cell count with differential, VDRL, and ME panel.  - If VDRL is positive, CSF protein > 45, and/or WBC count > 5 please continue treatment for neurosyphilis with a 14 day total course of penicillin G.  Today is day 1/14.  - if CSF studies are not indicative of neurosyphilis, assume late latent syphilis and treat patient with Bicillin 2.4 million units Q1 week for 3 weeks in total. Please administer first dose while inpatient. Patient will need outpatient follow-up to complete treatment.   - please consult case management for Syphilis treatment records from [place]  - continue eyedrops as recommended by Ophthalmology; appreciate  recommendations    Thank you for the consult. We will follow along with you. Please do not hesitate to call with any questions or concerns.     Denys Holt,   [facility]ospit[facility] Internal Medicine PGY-2

## 2024-11-28 LAB
ALBUMIN SERPL-MCNC: 4.2 G/DL (ref 3.5–5)
ALBUMIN/GLOB SERPL: 1.4 RATIO (ref 1.1–2)
ALP SERPL-CCNC: 66 UNIT/L (ref 40–150)
ALT SERPL-CCNC: 26 UNIT/L (ref 0–55)
ANION GAP SERPL CALC-SCNC: 9 MEQ/L
AST SERPL-CCNC: 17 UNIT/L (ref 5–34)
BASOPHILS # BLD AUTO: 0.03 X10(3)/MCL
BASOPHILS NFR BLD AUTO: 0.4 %
BILIRUB SERPL-MCNC: 0.6 MG/DL
BUN SERPL-MCNC: 12.4 MG/DL (ref 8.4–25.7)
CALCIUM SERPL-MCNC: 9.4 MG/DL (ref 8.4–10.2)
CHLORIDE SERPL-SCNC: 106 MMOL/L (ref 98–107)
CO2 SERPL-SCNC: 24 MMOL/L (ref 22–29)
CREAT SERPL-MCNC: 0.76 MG/DL (ref 0.72–1.25)
CREAT/UREA NIT SERPL: 16
EOSINOPHIL # BLD AUTO: 0.05 X10(3)/MCL (ref 0–0.9)
EOSINOPHIL NFR BLD AUTO: 0.6 %
ERYTHROCYTE [DISTWIDTH] IN BLOOD BY AUTOMATED COUNT: 12.1 % (ref 11.5–17)
GFR SERPLBLD CREATININE-BSD FMLA CKD-EPI: >60 ML/MIN/1.73/M2
GLOBULIN SER-MCNC: 3.1 GM/DL (ref 2.4–3.5)
GLUCOSE SERPL-MCNC: 145 MG/DL (ref 74–100)
HCT VFR BLD AUTO: 46.5 % (ref 42–52)
HGB BLD-MCNC: 16 G/DL (ref 14–18)
HIV 1+2 AB+HIV1 P24 AG SERPL QL IA: NONREACTIVE
IMM GRANULOCYTES # BLD AUTO: 0.04 X10(3)/MCL (ref 0–0.04)
IMM GRANULOCYTES NFR BLD AUTO: 0.5 %
LYMPHOCYTES # BLD AUTO: 2.47 X10(3)/MCL (ref 0.6–4.6)
LYMPHOCYTES NFR BLD AUTO: 31.5 %
MAGNESIUM SERPL-MCNC: 2.3 MG/DL (ref 1.6–2.6)
MCH RBC QN AUTO: 30.8 PG (ref 27–31)
MCHC RBC AUTO-ENTMCNC: 34.4 G/DL (ref 33–36)
MCV RBC AUTO: 89.6 FL (ref 80–94)
MONOCYTES # BLD AUTO: 0.63 X10(3)/MCL (ref 0.1–1.3)
MONOCYTES NFR BLD AUTO: 8 %
NEUTROPHILS # BLD AUTO: 4.62 X10(3)/MCL (ref 2.1–9.2)
NEUTROPHILS NFR BLD AUTO: 59 %
NRBC BLD AUTO-RTO: 0 %
PHOSPHATE SERPL-MCNC: 3.4 MG/DL (ref 2.3–4.7)
PLATELET # BLD AUTO: 183 X10(3)/MCL (ref 130–400)
PMV BLD AUTO: 10.9 FL (ref 7.4–10.4)
POCT GLUCOSE: 152 MG/DL (ref 70–110)
POCT GLUCOSE: 166 MG/DL (ref 70–110)
POCT GLUCOSE: 188 MG/DL (ref 70–110)
POCT GLUCOSE: 258 MG/DL (ref 70–110)
POTASSIUM SERPL-SCNC: 4.1 MMOL/L (ref 3.5–5.1)
PROT SERPL-MCNC: 7.3 GM/DL (ref 6.4–8.3)
RBC # BLD AUTO: 5.19 X10(6)/MCL (ref 4.7–6.1)
SODIUM SERPL-SCNC: 139 MMOL/L (ref 136–145)
WBC # BLD AUTO: 7.84 X10(3)/MCL (ref 4.5–11.5)

## 2024-11-28 PROCEDURE — 11000001 HC ACUTE MED/SURG PRIVATE ROOM

## 2024-11-28 PROCEDURE — 25000003 PHARM REV CODE 250

## 2024-11-28 PROCEDURE — C1751 CATH, INF, PER/CENT/MIDLINE: HCPCS

## 2024-11-28 PROCEDURE — 84100 ASSAY OF PHOSPHORUS: CPT

## 2024-11-28 PROCEDURE — 36415 COLL VENOUS BLD VENIPUNCTURE: CPT

## 2024-11-28 PROCEDURE — 80053 COMPREHEN METABOLIC PANEL: CPT

## 2024-11-28 PROCEDURE — 63600175 PHARM REV CODE 636 W HCPCS

## 2024-11-28 PROCEDURE — 85025 COMPLETE CBC W/AUTO DIFF WBC: CPT

## 2024-11-28 PROCEDURE — 83735 ASSAY OF MAGNESIUM: CPT

## 2024-11-28 PROCEDURE — 02HV33Z INSERTION OF INFUSION DEVICE INTO SUPERIOR VENA CAVA, PERCUTANEOUS APPROACH: ICD-10-PCS | Performed by: INTERNAL MEDICINE

## 2024-11-28 PROCEDURE — 36569 INSJ PICC 5 YR+ W/O IMAGING: CPT

## 2024-11-28 PROCEDURE — 87389 HIV-1 AG W/HIV-1&-2 AB AG IA: CPT

## 2024-11-28 RX ORDER — SODIUM CHLORIDE 0.9 % (FLUSH) 0.9 %
10 SYRINGE (ML) INJECTION EVERY 12 HOURS PRN
Status: DISCONTINUED | OUTPATIENT
Start: 2024-11-28 | End: 2024-11-30 | Stop reason: HOSPADM

## 2024-11-28 RX ADMIN — PREDNISOLONE ACETATE 1 DROP: 10 SUSPENSION/ DROPS OPHTHALMIC at 02:11

## 2024-11-28 RX ADMIN — PREDNISOLONE ACETATE 1 DROP: 10 SUSPENSION/ DROPS OPHTHALMIC at 06:11

## 2024-11-28 RX ADMIN — ENOXAPARIN SODIUM 40 MG: 40 INJECTION SUBCUTANEOUS at 05:11

## 2024-11-28 RX ADMIN — CYCLOPENTOLATE HYDROCHLORIDE 1 DROP: 10 SOLUTION/ DROPS OPHTHALMIC at 09:11

## 2024-11-28 RX ADMIN — DEXTROSE MONOHYDRATE 24 MILLION UNITS: 50 INJECTION, SOLUTION INTRAVENOUS at 10:11

## 2024-11-28 RX ADMIN — CYCLOPENTOLATE HYDROCHLORIDE 1 DROP: 10 SOLUTION/ DROPS OPHTHALMIC at 08:11

## 2024-11-28 RX ADMIN — PREDNISOLONE ACETATE 1 DROP: 10 SUSPENSION/ DROPS OPHTHALMIC at 09:11

## 2024-11-28 RX ADMIN — CYCLOPENTOLATE HYDROCHLORIDE 1 DROP: 10 SOLUTION/ DROPS OPHTHALMIC at 02:11

## 2024-11-28 RX ADMIN — ATORVASTATIN CALCIUM 20 MG: 20 TABLET, FILM COATED ORAL at 09:11

## 2024-11-28 RX ADMIN — PREDNISOLONE ACETATE 1 DROP: 10 SUSPENSION/ DROPS OPHTHALMIC at 05:11

## 2024-11-28 RX ADMIN — INSULIN ASPART 3 UNITS: 100 INJECTION, SOLUTION INTRAVENOUS; SUBCUTANEOUS at 05:11

## 2024-11-28 NOTE — PLAN OF CARE
Spoke to Valentin with [facility], P: [phone], regarding status of OPAT set up. She stated that patient has been accepted and cleared by insurance for DC tomorrow, 11/29/24.

## 2024-11-28 NOTE — PLAN OF CARE
Problem: Adult Inpatient Plan of Care  Goal: Plan of Care Review  Outcome: Progressing  Goal: Patient-Specific Goal (Individualized)  Outcome: Progressing  Goal: Absence of Hospital-Acquired Illness or Injury  Outcome: Progressing  Goal: Optimal Comfort and Wellbeing  Outcome: Progressing  Goal: Readiness for Transition of Care  Outcome: Progressing     Problem: Diabetes Comorbidity  Goal: Blood Glucose Level Within Targeted Range  Outcome: Progressing     Problem: Fall Injury Risk  Goal: Absence of Fall and Fall-Related Injury  Outcome: Progressing     Problem: Infection  Goal: Absence of Infection Signs and Symptoms  Outcome: Progressing

## 2024-11-28 NOTE — PROCEDURES
"Chance Vu is a 54 y.o. male patient.    Temp: 98.3 °F (36.8 °C) (11/27/24 2307)  Pulse: (!) 57 (11/27/24 2307)  Resp: 20 (11/27/24 2307)  BP: 117/75 (11/27/24 2307)  SpO2: 97 % (11/27/24 2307)  Weight: 57.5 kg (126 lb 12.2 oz) (11/26/24 1500)  Height: 5' 3" (160 cm) (11/26/24 1500)    PICC  Date/Time: 11/28/2024 3:15 AM  Performed by: Jeremy Liu RN  Consent Done: Yes  Time out: Immediately prior to procedure a time out was called to verify the correct patient, procedure, equipment, support staff and site/side marked as required  Indications: med administration and vascular access  Anesthesia: local infiltration  Local anesthetic: lidocaine 1% without epinephrine  Anesthetic Total (mL): 5  Preparation: skin prepped with ChloraPrep  Skin prep agent dried: skin prep agent completely dried prior to procedure  Sterile barriers: all five maximum sterile barriers used - cap, mask, sterile gown, sterile gloves, and large sterile sheet  Hand hygiene: hand hygiene performed prior to central venous catheter insertion  Location details: right brachial  Catheter type: single lumen  Catheter size: 5 Fr  Catheter Length: 36cm    Ultrasound guidance: yes  Vessel Caliber: medium and patent, compressibility normal  Needle advanced into vessel with real time Ultrasound guidance.  Guidewire confirmed in vessel.  Sterile sheath used.  Number of attempts: 1  Post-procedure: blood return through all ports and sterile dressing applied    Assessment: placement verified by x-ray  Complications: none        Jeremy Liu RN  11/28/2024    "

## 2024-11-28 NOTE — H&P
[facility] Medicine Wards History & Physical Note     Resident Team: [facility] Medicine List 3  Attending Physician: Nisha Tony MD  Resident: DO Xena  Intern: MD Nesha     Date of Admit: 11/26/2024    Chief Complaint     abnormal labs (C/o elevated rpr . Spoke with ID  dr and was told to come to ER to be evaluated due to hx of ocular syphilis)      Subjective:      History of Present Illness:  Chance Vu is a 54 y.o. male with PMH of DM 2, HTN, HLD, and ocular syphilis (reportedly treated x3 times previously) who presented to the ED on 11/26/24 after his recent blood work in the ID clinic showed elevated RPR levels of 1:128. Patient complaints of visual blurriness having increased since 2 weeks after having had a new sexual partner; unknown STD status of partner. He also confirms B/L photosensitivity and flashing lights in his R eye at baseline. He made an appointment with an ophthalmologist in [place], where he lives, and was told he had exam findings concerning for inflammation and ocular syphilis. Patient says he came to [place] on Sunday to have blood work done. Denies any fevers, chills, rashes, genital ulcers, night sweats, sore throat, chest pain, SOB, cough, nausea, vomiting, diarrhea, constipation, headaches, dizziness or any other complaints. Patient reports recently having undergone cataract surgery. He has had a history of ocular syphilis since 2 years ago and has received 3 courses of IV penicillin - 1st in 01/2023, then in 04/2023, and then again in 04/2024. Every course of antibiotics has been driven by his visual symptoms and his RPR. His RPR reportedly was 1:128 initially, dropped to 1:32 then back to 1:64 and again dropped to 1:32 and has reportedly stayed there. Sexually active with multiple male partners, reports more than 5 in the past year - firmly denies any anal or penetrative intercourse over the past 6 months since last treatment, mentions strict use of hands only. Patient mentions having  experienced a blister on his R index finger post previous encounter that has now healed. Denies smoking, alcohol or drug use. He follows with retina specialist Dr. Jocelyne Peña in [place] as well as Dr. Joe Munoz with Infectious disease in [place]. He has provided contact details of the latter for the primary team to be able to contact him regarding previous treatments.     In the ED, patient was afebrile, /87, pulse 113 bpm, RR 20, SpO2 96%. CBC and CMP wnl at ID clinic yesterday. IM was consulted for lumbar puncture and to begin treatment with IV penicillin.     Interval history  NAEON. VSS. Patient mentions slightly improved visual blurriness, mentions it is hard to gauge while in the room. Denies any fevers, chills, nausea, vomiting, diarrhea, constipation, headaches, dizziness or any other complaints overnight. He has been accepted by [facility]. Per case management, patient will be ready to go tomorrow. Will coordinate with IR regarding re-tap for LP and plan accordingly.          Past Medical History:  Past Medical History:   Diagnosis Date    Diabetes mellitus, type 2     Hyperlipidemia     Hypertension     Syphilis, unspecified        Past Surgical History:  Past Surgical History:   Procedure Laterality Date    CIRCUMCISION         Family History:  Family History   Problem Relation Name Age of Onset    Diabetes Father N/a        Social History:  Social History     Tobacco Use    Smoking status: Never    Smokeless tobacco: Never   Substance Use Topics    Alcohol use: Never    Drug use: Never       Allergies:  Review of patient's allergies indicates:  No Known Allergies    Home Medications:  Prior to Admission medications    Medication Sig Start Date End Date Taking? Authorizing Provider   atorvastatin (LIPITOR) 20 MG tablet Take 1 tablet (20 mg total) by mouth once daily. 9/10/24 12/9/24  Pinky Cr MD   bromfenac (PROLENSA) 0.07 % Drop     Provider, Historical   losartan (COZAAR)  50 MG tablet Take 1 tablet (50 mg total) by mouth once daily. 9/10/24 12/9/24  Pinky Cr MD   metFORMIN (GLUCOPHAGE) 850 MG tablet Take 1 tablet (850 mg total) by mouth daily with breakfast. 9/10/24 12/9/24  Pinky Cr MD   prednisoLONE acetate (PRED FORTE) 1 % DrpS INSTILL 1 DROP INTO BOTH EYES SIX TIMES A DAY    Provider, Historical         Review of Systems:  .  A 12 points ROS was performed and is negative unless specified in HPI         Objective:   Last 24 Hour Vital Signs:  BP  Min: 117/75  Max: 138/93  Temp  Av.1 °F (36.7 °C)  Min: 97.9 °F (36.6 °C)  Max: 98.3 °F (36.8 °C)  Pulse  Av.2  Min: 57  Max: 88  Resp  Av.3  Min: 17  Max: 20  SpO2  Av.5 %  Min: 95 %  Max: 100 %  Body mass index is 22.46 kg/m².  I/O last 3 completed shifts:  In: 1217.5 [P.O.:720; IV Piggyback:497.5]  Out: -     Physical Examination:  General: Nontoxic-appearing, well nourished, in no acute distress  Eye: PERRL, EOMI.   HENT: Normocephalic, atraumatic, moist mucous membranes  Neck: Supple, nontender, no JVD  Respiratory: Clear to auscultation bilaterally, no wheezes, rales, or rhonchi. Normal work of breathing  Cardiovascular: Regular rate and rhythm, no murmur, rubs, or gallops. No peripheral edema. 2+ radial pulses  Gastrointestinal: Soft, nontender, nondistended  Musculoskeletal: Moves all extremities purposefully. No gross deformities. No calf tenderness  Integumentary: Warm, dry, intact. No rashes.  Neurologic: Alert and oriented x3. Normal speech. No gross deficits  Psychiatric: Appropriate mood and affect            Laboratory:  Most Recent Data:  CBC:   Lab Results   Component Value Date    WBC 7.84 2024    HGB 16.0 2024    HCT 46.5 2024     2024    MCV 89.6 2024    RDW 12.1 2024     WBC Differential:   Recent Labs   Lab 24  0859 24  0332 24  033   WBC 6.84 7.83 7.84   HGB 15.5 14.9 16.0   HCT 46.4 43.3 46.5     "198 183   MCV 90.6 90.0 89.6     BMP:   Lab Results   Component Value Date     11/28/2024    K 4.1 11/28/2024     11/28/2024    CO2 24 11/28/2024    BUN 12.4 11/28/2024    CREATININE 0.76 11/28/2024    CALCIUM 9.4 11/28/2024    MG 2.30 11/28/2024    PHOS 3.4 11/28/2024     LFTs:   Lab Results   Component Value Date    ALBUMIN 4.2 11/28/2024    BILITOT 0.6 11/28/2024    AST 17 11/28/2024    ALKPHOS 66 11/28/2024    ALT 26 11/28/2024     Coags: No results found for: "INR", "PROTIME", "PTT"  FLP:   Lab Results   Component Value Date    CHOL 157 11/26/2024    HDL 39 11/26/2024    LDLCALC 91 06/12/2024    TRIG 252 (H) 11/26/2024     DM:   Lab Results   Component Value Date    HGBA1C 6.2 11/26/2024    HGBA1C 6.3 09/10/2024    HGBA1C 7.1 (A) 06/12/2024    MICROALBUR 10.0 06/12/2024    LDLCALC 91 06/12/2024    CREATININE 0.76 11/28/2024     Thyroid: No results found for: "TSH", "FREET4", "B9CWHQG", "E3UWVYB", "THYROIDAB"  Anemia: No results found for: "IRON", "TIBC", "FERRITIN", "[id number]", "FOLATE"  Cardiac: No results found for: "TROPONINI", "CKTOTAL", "CKMB", "BNP"  Urinalysis:   Lab Results   Component Value Date    COLORU Colorless 09/10/2024    NITRITE Negative 09/10/2024    UROBILINOGEN Normal 09/10/2024    WBCUA None Seen 09/10/2024       Trended Lab Data:  Recent Labs   Lab 11/25/24  0859 11/27/24  0331 11/27/24  0332 11/28/24 0332   WBC 6.84  --  7.83 7.84   HGB 15.5  --  14.9 16.0   HCT 46.4  --  43.3 46.5     --  198 183   MCV 90.6  --  90.0 89.6   RDW 12.6  --  12.3 12.1    138  --  139   K 4.3 4.5  --  4.1    106  --  106   CO2 27 26  --  24   BUN 12.0 15.2  --  12.4   CREATININE 0.82 0.92  --  0.76   ALBUMIN 4.7 4.0  --  4.2   BILITOT 0.8 0.4  --  0.6   AST 19 15  --  17   ALKPHOS 73 64  --  66   ALT 31 25  --  26       Trended Cardiac Data:  No results for input(s): "TROPONINI", "CKTOTAL", "CKMB", "BNP" in the last 168 hours.      Radiology:  Imaging Results    None          "   Assessment & Plan:     Visual blurriness  Neurosyphilis  Hx of ocular syphilis (Dx 12/2022 - treated x3 times)  - Multiple male sexual partners  - Syphilis antibody positive. RPR increased to 1:128 from 1:64. Previous trend in HPI  - Quant gold,  HIV 1/2 Ag/Ab, chlamydia, gonorrhea negative 8/8/24.  - Has previously received 3 courses of IV penicillin - 12/2022 (RPR 1:32), after RPR found 1:64 2nd treatment in 06/2023 (RPR 1:32), and third time in 04/2024 due to worsening visual symptoms.  - LP was attempted but was not successful due to fluid draining too slowly  - IR re-attempted LP this am, obtained dry tap.  - Patient started on IV penicillin G 24 million units D5W 500 mL continuous infusion  - Ophthalmology consulted; appreciate their assistance.   - Per their recommendations:  Treat as neurosyphilis  Continue Pred Forte x6/day  Start cyclopentolate 1% TID   Stop Ketoralac BID   Labs for TB (quant gold), Sarcoid (ace, lysozyme, CTX), HIV 1/2, other STD testing, HLA-A29,  HLA-B27, HLA-DR5, HLA-B51 pending  - ID consulted; appreciate their assistance. Per their recommendations:   Recommend repeat LP   Continue Penicillin 24 million units IV q24h for 14 days, despite LP results due to symptoms    HIV negative. GC/chlamydia oral swab and rectal swab pending              Consulted CM to obtain full syphilis treatment records, and LP results (3/2024) from [facility] in [place].    Okay to place PICC    After current treatment is completed, recommend Bicillin 2.4 million units IM x3 qweekly.  Will need ID follow up in 3 months to recheck RPR. Patient has appt scheduled in [place] in 1/2025.  - Patient has been accepted for OPAT by [facility]. Per case management, he may be sent out tomorrow. Will coordinate with IR regarding re-tap for LP and plan accordingly.     DM 2  - Last HbA1c 6.2   - Low dose SSI in place    HTN  - BP max in 's/90's  - PRNs in place with parameters    HLD  - Lipid panel  wnl  - Continue home atorvastatin 20 mg daily    CODE STATUS: Full code  Access: PIV  Antibiotics: IV penicillin G  Diet: Heart healthy 2000 calories  DVT Prophylaxis: enoxaparin   GI Prophylaxis: -  Fluids: -      Disposition: Patient admitted to IM for LP and penicillin treatment requiring neuro/ocular syphilis. Started IV penicillin G 24 million units infusion. Accepted for OPAT by [facility]. Dispo pending IR plan for LP tomorrow.    Esther Jeffries MD  Internal Medicine - PGY-1

## 2024-11-29 VITALS
OXYGEN SATURATION: 98 % | RESPIRATION RATE: 16 BRPM | WEIGHT: 126.75 LBS | SYSTOLIC BLOOD PRESSURE: 128 MMHG | DIASTOLIC BLOOD PRESSURE: 82 MMHG | HEIGHT: 63 IN | BODY MASS INDEX: 22.46 KG/M2 | HEART RATE: 88 BPM | TEMPERATURE: 98 F

## 2024-11-29 LAB
ALBUMIN SERPL-MCNC: 3.9 G/DL (ref 3.5–5)
ALBUMIN/GLOB SERPL: 1.3 RATIO (ref 1.1–2)
ALP SERPL-CCNC: 60 UNIT/L (ref 40–150)
ALT SERPL-CCNC: 29 UNIT/L (ref 0–55)
ANION GAP SERPL CALC-SCNC: 9 MEQ/L
AST SERPL-CCNC: 19 UNIT/L (ref 5–34)
BASOPHILS # BLD AUTO: 0.03 X10(3)/MCL
BASOPHILS NFR BLD AUTO: 0.4 %
BILIRUB SERPL-MCNC: 0.7 MG/DL
BUN SERPL-MCNC: 15.7 MG/DL (ref 8.4–25.7)
CALCIUM SERPL-MCNC: 9 MG/DL (ref 8.4–10.2)
CHLORIDE SERPL-SCNC: 107 MMOL/L (ref 98–107)
CO2 SERPL-SCNC: 24 MMOL/L (ref 22–29)
CREAT SERPL-MCNC: 0.75 MG/DL (ref 0.72–1.25)
CREAT/UREA NIT SERPL: 21
EOSINOPHIL # BLD AUTO: 0.07 X10(3)/MCL (ref 0–0.9)
EOSINOPHIL NFR BLD AUTO: 0.9 %
ERYTHROCYTE [DISTWIDTH] IN BLOOD BY AUTOMATED COUNT: 12.2 % (ref 11.5–17)
GFR SERPLBLD CREATININE-BSD FMLA CKD-EPI: >60 ML/MIN/1.73/M2
GLOBULIN SER-MCNC: 2.9 GM/DL (ref 2.4–3.5)
GLUCOSE SERPL-MCNC: 168 MG/DL (ref 74–100)
HCT VFR BLD AUTO: 43 % (ref 42–52)
HGB BLD-MCNC: 14.9 G/DL (ref 14–18)
IMM GRANULOCYTES # BLD AUTO: 0.05 X10(3)/MCL (ref 0–0.04)
IMM GRANULOCYTES NFR BLD AUTO: 0.6 %
LYMPHOCYTES # BLD AUTO: 2.38 X10(3)/MCL (ref 0.6–4.6)
LYMPHOCYTES NFR BLD AUTO: 30.8 %
MAGNESIUM SERPL-MCNC: 2.2 MG/DL (ref 1.6–2.6)
MCH RBC QN AUTO: 30.5 PG (ref 27–31)
MCHC RBC AUTO-ENTMCNC: 34.7 G/DL (ref 33–36)
MCV RBC AUTO: 88.1 FL (ref 80–94)
MONOCYTES # BLD AUTO: 0.72 X10(3)/MCL (ref 0.1–1.3)
MONOCYTES NFR BLD AUTO: 9.3 %
NEUTROPHILS # BLD AUTO: 4.47 X10(3)/MCL (ref 2.1–9.2)
NEUTROPHILS NFR BLD AUTO: 58 %
NRBC BLD AUTO-RTO: 0 %
PHOSPHATE SERPL-MCNC: 3.6 MG/DL (ref 2.3–4.7)
PLATELET # BLD AUTO: 178 X10(3)/MCL (ref 130–400)
PMV BLD AUTO: 11.3 FL (ref 7.4–10.4)
POCT GLUCOSE: 153 MG/DL (ref 70–110)
POCT GLUCOSE: 199 MG/DL (ref 70–110)
POCT GLUCOSE: 215 MG/DL (ref 70–110)
POTASSIUM SERPL-SCNC: 3.9 MMOL/L (ref 3.5–5.1)
PROT SERPL-MCNC: 6.8 GM/DL (ref 6.4–8.3)
RBC # BLD AUTO: 4.88 X10(6)/MCL (ref 4.7–6.1)
SODIUM SERPL-SCNC: 140 MMOL/L (ref 136–145)
WBC # BLD AUTO: 7.72 X10(3)/MCL (ref 4.5–11.5)

## 2024-11-29 PROCEDURE — 25000003 PHARM REV CODE 250: Performed by: INTERNAL MEDICINE

## 2024-11-29 PROCEDURE — 00JU3ZZ INSPECTION OF SPINAL CANAL, PERCUTANEOUS APPROACH: ICD-10-PCS | Performed by: INTERNAL MEDICINE

## 2024-11-29 PROCEDURE — 83735 ASSAY OF MAGNESIUM: CPT

## 2024-11-29 PROCEDURE — 36415 COLL VENOUS BLD VENIPUNCTURE: CPT

## 2024-11-29 PROCEDURE — 25000003 PHARM REV CODE 250

## 2024-11-29 PROCEDURE — 63600175 PHARM REV CODE 636 W HCPCS

## 2024-11-29 PROCEDURE — 80053 COMPREHEN METABOLIC PANEL: CPT

## 2024-11-29 PROCEDURE — 85025 COMPLETE CBC W/AUTO DIFF WBC: CPT

## 2024-11-29 PROCEDURE — 84100 ASSAY OF PHOSPHORUS: CPT

## 2024-11-29 RX ORDER — MUPIROCIN 20 MG/G
OINTMENT TOPICAL 2 TIMES DAILY
Status: DISCONTINUED | OUTPATIENT
Start: 2024-11-29 | End: 2024-11-30 | Stop reason: HOSPADM

## 2024-11-29 RX ORDER — CYCLOPENTOLATE HYDROCHLORIDE 10 MG/ML
1 SOLUTION/ DROPS OPHTHALMIC 3 TIMES DAILY
Qty: 5 ML | Refills: 1 | Status: SHIPPED | OUTPATIENT
Start: 2024-11-29 | End: 2024-12-03 | Stop reason: ALTCHOICE

## 2024-11-29 RX ORDER — PREDNISOLONE ACETATE 10 MG/ML
1 SUSPENSION/ DROPS OPHTHALMIC
Qty: 10 ML | Refills: 1 | Status: SHIPPED | OUTPATIENT
Start: 2024-11-29 | End: 2024-12-03 | Stop reason: SDUPTHER

## 2024-11-29 RX ORDER — LIDOCAINE HYDROCHLORIDE 10 MG/ML
10 INJECTION, SOLUTION INFILTRATION; PERINEURAL ONCE
Status: DISCONTINUED | OUTPATIENT
Start: 2024-11-29 | End: 2024-11-29

## 2024-11-29 RX ORDER — LIDOCAINE HYDROCHLORIDE 10 MG/ML
10 INJECTION, SOLUTION INFILTRATION; PERINEURAL ONCE
Status: COMPLETED | OUTPATIENT
Start: 2024-11-29 | End: 2024-11-29

## 2024-11-29 RX ADMIN — PREDNISOLONE ACETATE 1 DROP: 10 SUSPENSION/ DROPS OPHTHALMIC at 09:11

## 2024-11-29 RX ADMIN — PREDNISOLONE ACETATE 1 DROP: 10 SUSPENSION/ DROPS OPHTHALMIC at 02:11

## 2024-11-29 RX ADMIN — MUPIROCIN: 20 OINTMENT TOPICAL at 06:11

## 2024-11-29 RX ADMIN — ATORVASTATIN CALCIUM 20 MG: 20 TABLET, FILM COATED ORAL at 09:11

## 2024-11-29 RX ADMIN — CYCLOPENTOLATE HYDROCHLORIDE 1 DROP: 10 SOLUTION/ DROPS OPHTHALMIC at 09:11

## 2024-11-29 RX ADMIN — INSULIN ASPART 2 UNITS: 100 INJECTION, SOLUTION INTRAVENOUS; SUBCUTANEOUS at 08:11

## 2024-11-29 RX ADMIN — PREDNISOLONE ACETATE 1 DROP: 10 SUSPENSION/ DROPS OPHTHALMIC at 06:11

## 2024-11-29 RX ADMIN — CYCLOPENTOLATE HYDROCHLORIDE 1 DROP: 10 SOLUTION/ DROPS OPHTHALMIC at 08:11

## 2024-11-29 RX ADMIN — ENOXAPARIN SODIUM 40 MG: 40 INJECTION SUBCUTANEOUS at 06:11

## 2024-11-29 RX ADMIN — CYCLOPENTOLATE HYDROCHLORIDE 1 DROP: 10 SOLUTION/ DROPS OPHTHALMIC at 02:11

## 2024-11-29 RX ADMIN — LIDOCAINE HYDROCHLORIDE 10 ML: 10 INJECTION, SOLUTION INFILTRATION; PERINEURAL at 11:11

## 2024-11-29 NOTE — DISCHARGE SUMMARY
[facility]  [facility] Internal Medicine   DISCHARGE SUMMARY    Patient's Name: Chance Vu  : 1970  MRN: [id number]  Code Status: Full Code    Admitting Physician: Nisha Tony MD  Attending Physician: Nisha Tony MD  Primary Care Provider: Pinky Cr MD  Date of Admission: 2024  Date of Discharge: 2024    Condition: Good  Outcome: Condition has improved and patient is now ready for discharge.  Disposition: Home OPAT with [facility]    Discharge Diagnoses     Patient Active Problem List   Diagnosis    Syphilis    Ocular syphilis    Routine screening for STI (sexually transmitted infection)    High risk sexual behavior    Type 2 diabetes mellitus with hyperglycemia, without long-term current use of insulin    Hypertension associated with diabetes    Hyperlipidemia associated with type 2 diabetes mellitus    Late latent syphilis    Microalbuminuria    Vitamin D deficiency       Principal Problem:  Ocular syphilis      Consultants and Procedures   Consultants:  IP CONSULT TO INTERNAL MEDICINE  IP CONSULT TO OPHTHALMOLOGY  IP CONSULT TO INFECTIOUS DISEASES  IP CONSULT TO INTERVENTIONAL RADIOLOGY  IP CONSULT TO SOCIAL WORK/CASE MANAGEMENT  IP CONSULT TO SOCIAL WORK/CASE MANAGEMENT  IP CONSULT TO SOCIAL WORK/CASE MANAGEMENT  IP CONSULT TO PICC TEAM ([facility])    Procedures:   * No surgery found *       Brief Admission History     Chance Vu is a 54 y.o. male with PMH of DM 2, HTN, HLD, and ocular syphilis (reportedly treated x3 times previously) who presented to the ED on 24 after his recent blood work in the ID clinic showed elevated RPR levels of 1:128. Patient complaints of visual blurriness having increased since 2 weeks after having had a new sexual partner; unknown STD status of partner. He also confirms B/L photosensitivity and flashing lights in his R eye at baseline. He made an appointment with an ophthalmologist in [place], where he lives,  and was told he had exam findings concerning for inflammation and ocular syphilis. Patient says he came to [place] on Sunday to have blood work done. Denies any fevers, chills, rashes, genital ulcers, night sweats, sore throat, chest pain, SOB, cough, nausea, vomiting, diarrhea, constipation, headaches, dizziness or any other complaints. Patient reports recently having undergone cataract surgery. He has had a history of ocular syphilis since 2 years ago and has received 3 courses of IV penicillin - 1st in 01/2023, then in 04/2023, and then again in 04/2024. Every course of antibiotics has been driven by his visual symptoms and his RPR. His RPR reportedly was 1:128 initially, dropped to 1:32 then back to 1:64 and again dropped to 1:32 and has reportedly stayed there. Sexually active with multiple male partners, reports more than 5 in the past year - firmly denies any anal or penetrative intercourse over the past 6 months since last treatment, mentions strict use of hands only. Patient mentions having experienced a blister on his R index finger post previous encounter that has now healed. Denies smoking, alcohol or drug use. He follows with retina specialist Dr. Jocelyne Peña in [place] as well as Dr. Joe Munoz with Infectious disease in [place]. He has provided contact details of the latter for the primary team to be able to contact him regarding previous treatments.      In the ED, patient was afebrile, /87, pulse 113 bpm, RR 20, SpO2 96%. CBC and CMP wnl at ID clinic yesterday. IM was consulted for lumbar puncture and to begin treatment with IV penicillin.       Hospital Course with Pertinent Findings     Patient was admitted on 11/26/24 for management of ocular syphilis with complaints of recently worsening visual blurriness. Ophthalmology evaluated patient on admission and noted OD uveitis and OS panuveitis which is highly suspicious for ocular syphilis. Their recommendations for further work up and  "treatment were incorporated. A lumbar puncture was attempted the first day by primary team which resulted in a dry tap. IV continuous infusion of penicillin 24 million U in D5W was started. Interventional Radiology attempted another LP on 11/27/24 which was also a dry tap. ID was consulted for management of recurrent syphilis - per them this is likely a case of treatment failures, in part due to ceftriaxone instead of PCN as the drug of choice in the past. IV penicillin treatment was continued while case management was consulted to retrieve past records of treatment and LP results (3/2024) from [facility] in [place] and to set up outpatient parenteral antimicrobial therapy. Patient was accepted by [facility] and is clear for discharge today, after their representative delivers first 7 days home dose and educates patient regarding penicillin administration. Of note, after completion of 14 day IV penicillin 24 million units on 12/11/24, patient will need Bicillin 2.4 million units IM x3 qweekly. Required follow up visits to IM clinic, ID clinic and ophthalmology have been set up.     Discharge Physical Exam:  Vitals  BP: 125/84  Temp: 98.1 °F (36.7 °C)  Temp Source: Oral  Pulse: 76  Resp: 18  SpO2: 100 %  Height: 5' 3" (160 cm)  Weight: 57.5 kg (126 lb 12.2 oz)    Physical Examination:  General: Nontoxic-appearing, well nourished, in no acute distress  Eye: PERRL, EOMI.   HENT: Normocephalic, atraumatic, moist mucous membranes  Neck: Supple, nontender, no JVD  Respiratory: Clear to auscultation bilaterally, no wheezes, rales, or rhonchi. Normal work of breathing  Cardiovascular: Regular rate and rhythm, no murmur, rubs, or gallops. No peripheral edema. 2+ radial pulses  Gastrointestinal: Soft, nontender, nondistended  Musculoskeletal: Moves all extremities purposefully. No gross deformities. No calf tenderness  Integumentary: Warm, dry, intact. No rashes.  Neurologic: Alert and oriented x3. " Normal speech. No gross deficits  Psychiatric: Appropriate mood and affect       Discharge Medications        Medication List        START taking these medications      cyclopentolate 1% 1 % ophthalmic solution  Commonly known as: CYCLOGYL  Place 1 drop into both eyes 3 (three) times daily.            CHANGE how you take these medications      prednisoLONE acetate 1 % Drps  Commonly known as: PRED FORTE  Place 1 drop into both eyes 6 (six) times daily. for 10 days  What changed: See the new instructions.            CONTINUE taking these medications      atorvastatin 20 MG tablet  Commonly known as: LIPITOR  Take 1 tablet (20 mg total) by mouth once daily.     losartan 50 MG tablet  Commonly known as: COZAAR  Take 1 tablet (50 mg total) by mouth once daily.     metFORMIN 850 MG tablet  Commonly known as: GLUCOPHAGE  Take 1 tablet (850 mg total) by mouth daily with breakfast.            STOP taking these medications      bromfenac 0.07 % Drop  Commonly known as: PROLENSA     DESCOVY 200-25 mg Tab  Generic drug: emtricitabine-tenofovir alafen     ketorolac 0.5% 0.5 % Drop  Commonly known as: ACULAR               Where to Get Your Medications        These medications were sent to [id number] - [place], [place] - [address] [place] [address]      Phone: [phone]   cyclopentolate 1% 1 % ophthalmic solution  prednisoLONE acetate 1 % Drps           Discharge Information   Diet:  Diabetic diet    Physical Activity:  As tolerated    Counseling Provided to Patient and Family:  YES    Instructions:  1. Take all medications as prescribed.  2. Keep all follow-up appointments.  3. Return to the hospital or call your primary care physician if any worsening symptoms occur  4. The above information was discussed with the patient in clear terms. he was able to repeat the instructions to me in his own words. All questions  answered.  Patient verbalized understanding. ED precautions provided.    Follow-Up Appointments:  [facility] Internal Medicine clinic - post hospital follow up  [facility] Ophthalmology Clinic - post hospital follow up  [facility] Infectious Disease Clinic - post hospital follow up and 3 months later for repeat RPR levels.    Future Appointments:  Future Appointments   Date Time Provider Department Center   12/11/2024 11:00 AM [facility]       Follow up items to address with PCP and pending results at time of discharge:  Visual symptoms 2/2 ocular syphilis  Blood pressure and diabetes management    TIME SPENT ON DISCHARGE: 60 minutes      Case discussed with Dr. Tony. Please appreciate attending's attestation to follow.    Esther Jeffries MD  PGY-1 Resident  [facility] Internal Medicine Team 3  11/29/2024

## 2024-11-29 NOTE — PROCEDURES
"Chance Vu is a 54 y.o. male patient.    Temp: 98.1 °F (36.7 °C) (11/29/24 1124)  Pulse: 76 (11/29/24 1124)  Resp: 18 (11/29/24 0712)  BP: 125/84 (11/29/24 1124)  SpO2: 100 % (11/29/24 1124)  Weight: 57.5 kg (126 lb 12.2 oz) (11/26/24 1500)  Height: 5' 3" (160 cm) (11/26/24 1500)       Lumbar Puncture    Date/Time: 11/29/2024 12:23 PM  Location procedure was performed: [facility] MEDICINE    Performed by: Srinivasa Martin MD  Authorized by: Srinivasa Martin MD  Assisting provider: Jeremy Garza DO  Pre-operative diagnosis:  Neurosyphilis  Post-operative diagnosis: neurosyphiliis  Consent Done: Yes  Indications: evaluation for infection  Anesthesia: local infiltration    Anesthesia:  Local Anesthetic: lidocaine 1% without epinephrine  Anesthetic total: 10 mL    Patient sedated: no  Description of findings: none   Preparation: Patient was prepped and draped in the usual sterile fashion.  Lumbar space: L4-L5 interspace  Patient's position: sitting  Needle gauge: 20  Needle type: spinal needle - Quincke tip  Needle length: 2.5 in  Number of attempts: 2  Opening pressure: 0 cm H2O  Closing pressure: 0 cm H2O  Technical procedures used: seldinger  Complications: No  Estimated blood loss (mL): 1  Specimens: No  Implants: No  Comments: Unsuccessful lumbar tap          11/29/2024    " Event Note Event Note Event Note

## 2024-11-30 LAB
ACE SERPL-CCNC: 33 U/L (ref 16–85)
CEFOTAXIME ISLT MIC: 379 PG/ML

## 2024-11-30 NOTE — NURSING
The patient was discharged. He was transported to the [facility] via wheelchair. No distress voiced or noted at the time of discharge.

## 2024-12-02 ENCOUNTER — TELEPHONE (OUTPATIENT)
Dept: OPHTHALMOLOGY | Facility: CLINIC | Age: 54
End: 2024-12-02
Payer: COMMERCIAL

## 2024-12-02 LAB
GAMMA INTERFERON BACKGROUND BLD IA-ACNC: 0.08 IU/ML
HLA TYP COMM-IMP: NORMAL
HLA-B27 QL FC: NEGATIVE
LYSOZYME SERPL-MCNC: 2.9 MCG/ML (ref 2.6–6)
M TB IFN-G BLD-IMP: NEGATIVE
M TB IFN-G CD4+ BCKGRND COR BLD-ACNC: -0.01 IU/ML
M TB IFN-G CD4+CD8+ BCKGRND COR BLD-ACNC: -0.02 IU/ML
MITOGEN IGNF BCKGRD COR BLD-ACNC: 9.92 IU/ML

## 2024-12-02 NOTE — TELEPHONE ENCOUNTER
[facility] Clinical Support Staff  Caller: pt @ [phone] (Today,  9:42 AM)  Chance Vu calling regarding Appointment Access  (message) for *pt is calling to get np appt, pt has referral in chart, asking for call back  Called pt scheduled  apt with Dr. Gresham. He has appt for tomorrow closer top home. If he does not need to see us he will cancel appt with us.

## 2024-12-03 ENCOUNTER — OFFICE VISIT (OUTPATIENT)
Dept: OPHTHALMOLOGY | Facility: CLINIC | Age: 54
End: 2024-12-03
Payer: COMMERCIAL

## 2024-12-03 ENCOUNTER — OFFICE VISIT (OUTPATIENT)
Dept: FAMILY MEDICINE | Facility: CLINIC | Age: 54
End: 2024-12-03
Payer: COMMERCIAL

## 2024-12-03 VITALS — HEIGHT: 63 IN | WEIGHT: 124.75 LBS | BODY MASS INDEX: 22.11 KG/M2

## 2024-12-03 VITALS
DIASTOLIC BLOOD PRESSURE: 81 MMHG | RESPIRATION RATE: 18 BRPM | HEART RATE: 89 BPM | SYSTOLIC BLOOD PRESSURE: 124 MMHG | BODY MASS INDEX: 22.09 KG/M2 | WEIGHT: 124.69 LBS | OXYGEN SATURATION: 98 % | HEIGHT: 63 IN | TEMPERATURE: 98 F

## 2024-12-03 DIAGNOSIS — H35.81 MACULAR EDEMA: Primary | ICD-10-CM

## 2024-12-03 DIAGNOSIS — Z23 NEED FOR VACCINATION FOR PNEUMOCOCCUS: ICD-10-CM

## 2024-12-03 DIAGNOSIS — E11.59 HYPERTENSION ASSOCIATED WITH DIABETES: ICD-10-CM

## 2024-12-03 DIAGNOSIS — A52.71 OCULAR SYPHILIS: ICD-10-CM

## 2024-12-03 DIAGNOSIS — H44.112 PANUVEITIS OF LEFT EYE: ICD-10-CM

## 2024-12-03 DIAGNOSIS — I15.2 HYPERTENSION ASSOCIATED WITH DIABETES: ICD-10-CM

## 2024-12-03 DIAGNOSIS — Z72.52 HIGH RISK HOMOSEXUAL BEHAVIOR: ICD-10-CM

## 2024-12-03 DIAGNOSIS — A52.3 NEUROSYPHILIS: ICD-10-CM

## 2024-12-03 DIAGNOSIS — Z09 HOSPITAL DISCHARGE FOLLOW-UP: Primary | ICD-10-CM

## 2024-12-03 DIAGNOSIS — E11.65 TYPE 2 DIABETES MELLITUS WITH HYPERGLYCEMIA, WITHOUT LONG-TERM CURRENT USE OF INSULIN: ICD-10-CM

## 2024-12-03 DIAGNOSIS — H20.9 UVEITIS OF RIGHT EYE: ICD-10-CM

## 2024-12-03 PROCEDURE — 99213 OFFICE O/P EST LOW 20 MIN: CPT | Mod: PBBFAC,PN,25

## 2024-12-03 PROCEDURE — 92133 CPTRZD OPH DX IMG PST SGM ON: CPT | Mod: PBBFAC,PN | Performed by: OPHTHALMOLOGY

## 2024-12-03 PROCEDURE — 92133 CPTRZD OPH DX IMG PST SGM ON: CPT | Mod: PBBFAC,PN

## 2024-12-03 RX ORDER — PREDNISOLONE ACETATE 10 MG/ML
1 SUSPENSION/ DROPS OPHTHALMIC
Qty: 10 ML | Refills: 5 | Status: SHIPPED | OUTPATIENT
Start: 2024-12-03 | End: 2024-12-13

## 2024-12-03 RX ORDER — ATROPINE SULFATE 10 MG/ML
1 SOLUTION/ DROPS OPHTHALMIC 2 TIMES DAILY
Qty: 5 ML | Refills: 3 | Status: SHIPPED | OUTPATIENT
Start: 2024-12-03

## 2024-12-03 RX ORDER — KETOROLAC TROMETHAMINE 5 MG/ML
1 SOLUTION OPHTHALMIC 4 TIMES DAILY
Qty: 10 ML | Refills: 5 | Status: SHIPPED | OUTPATIENT
Start: 2024-12-03 | End: 2025-12-03

## 2024-12-03 RX ORDER — MOXIFLOXACIN 5 MG/ML
1 SOLUTION/ DROPS OPHTHALMIC 3 TIMES DAILY
COMMUNITY
Start: 2024-10-03

## 2024-12-03 NOTE — PROGRESS NOTES
Chance Vu  12/03/2024  [id number]    Pinky Cr MD  Patient Care Team:  Pinky Cr MD as PCP - General (Family Medicine)      Chief Complaint:  Chief Complaint   Patient presents with    Follow-up     Hospital Discharge Follow Up       History of Present Illness:    54 y.o. male who presents today for hospital discharge follow up. He has been battling ocular syphilis for two years with multiple treatment failures. He was admitted to hospital for high rpr with ocular symptoms and concerns for neurosyphilis. Patient primarily resides in [place], but gets some of his ID treatments here. He received IV penicillin while inpatient and has been set out of outpatient IV penicillin and then will need bicillin IM x 3. He already has f/u with ID.     He continue to have worsening vision changes. Compliant with eye drops. Has neuro-ophthalmologist appt today. Having difficulty driving with car lights shining on him.       Hospital course:     Chance Vu is a 54 y.o. male with PMH of DM 2, HTN, HLD, and ocular syphilis (reportedly treated x3 times previously) who presented to the ED on 11/26/24 after his recent blood work in the ID clinic showed elevated RPR levels of 1:128. Patient complaints of visual blurriness having increased since 2 weeks after having had a new sexual partner; unknown STD status of partner. He also confirms B/L photosensitivity and flashing lights in his R eye at baseline. He made an appointment with an ophthalmologist in [place], where he lives, and was told he had exam findings concerning for inflammation and ocular syphilis. Patient says he came to [place] on Sunday to have blood work done. Denies any fevers, chills, rashes, genital ulcers, night sweats, sore throat, chest pain, SOB, cough, nausea, vomiting, diarrhea, constipation, headaches, dizziness or any other complaints. Patient reports recently having undergone cataract surgery. He has had a history of ocular  Medication:   Requested Prescriptions     Pending Prescriptions Disp Refills    amphetamine-dextroamphetamine (ADDERALL, 5MG,) 5 MG tablet 30 tablet 0     Sig: Take 1 tablet by mouth daily as needed (decreased focus/concentration) for up to 30 days.  lisdexamfetamine (VYVANSE) 30 MG capsule 30 capsule 0     Sig: Take 1 capsule by mouth every morning for 30 days. Last Filled:   06/07/21     Patient Phone Number: [phone] (home)     Last appt: 4/23/2021 RTC Return in about 4 weeks (around 5/21/2021). Next appt: Visit date not found    Last OARRS:   RX Monitoring 4/26/2021   Periodic Controlled Substance Monitoring No signs of potential drug abuse or diversion identified. syphilis since 2 years ago and has received 3 courses of IV penicillin - 1st in 01/2023, then in 04/2023, and then again in 04/2024. Every course of antibiotics has been driven by his visual symptoms and his RPR. His RPR reportedly was 1:128 initially, dropped to 1:32 then back to 1:64 and again dropped to 1:32 and has reportedly stayed there. Sexually active with multiple male partners, reports more than 5 in the past year - firmly denies any anal or penetrative intercourse over the past 6 months since last treatment, mentions strict use of hands only. Patient mentions having experienced a blister on his R index finger post previous encounter that has now healed. Denies smoking, alcohol or drug use. He follows with retina specialist Dr. Jocelyne Peña in [place] as well as Dr. Joe Munoz with Infectious disease in [place]. He has provided contact details of the latter for the primary team to be able to contact him regarding previous treatments.      In the ED, patient was afebrile, /87, pulse 113 bpm, RR 20, SpO2 96%. CBC and CMP wnl at ID clinic yesterday. IM was consulted for lumbar puncture and to begin treatment with IV penicillin.         Hospital Course with Pertinent Findings      Patient was admitted on 11/26/24 for management of ocular syphilis with complaints of recently worsening visual blurriness. Ophthalmology evaluated patient on admission and noted OD uveitis and OS panuveitis which is highly suspicious for ocular syphilis. Their recommendations for further work up and treatment were incorporated. A lumbar puncture was attempted the first day by primary team which resulted in a dry tap. IV continuous infusion of penicillin 24 million U in D5W was started. Interventional Radiology attempted another LP on 11/27/24 which was also a dry tap. ID was consulted for management of recurrent syphilis - per them this is likely a case of treatment failures, in part due to ceftriaxone instead of PCN as the  drug of choice in the past. IV penicillin treatment was continued while case management was consulted to retrieve past records of treatment and LP results (3/2024) from [facility] in [place] and to set up outpatient parenteral antimicrobial therapy. Patient was accepted by [facility] and is clear for discharge today, after their representative delivers first 7 days home dose and educates patient regarding penicillin administration. Of note, after completion of 14 day IV penicillin 24 million units on 12/11/24, patient will need Bicillin 2.4 million units IM x3 qweekly. Required follow up visits to IM clinic, ID clinic and ophthalmology have been set up.     Date of Admission: 11/26/2024  Date of Discharge: 11/29/2024    Family and/or Caretaker present at visit?  No.  Diagnostic tests reviewed/disposition: No diagnosic tests pending after this hospitalization.  Disease/illness education: yes  Home health/community services discussion/referrals: Patient has home health established at [facility] .   Establishment or re-establishment of referral orders for community resources: No other necessary community resources.   Discussion with other health care providers: No discussion with other health care providers necessary.  I reviewed and reconciled the medications from hospital discharge.      Review of Systems  General: denies f/c, weight loss, night sweats, decreased appetite  Respiratory: denies sob, wheezing, cough  Cardiovascular: denies chest pain, palpitations, edema  Gastrointestinal: denies abdominal pain, n/v, constipation, diarrhea  Integumentary: denies rashes, pruritis    Past Medical History  Past Medical History:   Diagnosis Date    Diabetes mellitus, type 2     Hyperlipidemia     Hypertension     Syphilis, unspecified        Medications  Medication List with Changes/Refills   Current Medications    ATORVASTATIN (LIPITOR) 20 MG TABLET    Take 1 tablet (20 mg total) by mouth  "once daily.    CYCLOPENTOLATE 1% (CYCLOGYL) 1 % OPHTHALMIC SOLUTION    Place 1 drop into both eyes 3 (three) times daily.    LOSARTAN (COZAAR) 50 MG TABLET    Take 1 tablet (50 mg total) by mouth once daily.    METFORMIN (GLUCOPHAGE) 850 MG TABLET    Take 1 tablet (850 mg total) by mouth daily with breakfast.    MOXIFLOXACIN (VIGAMOX) 0.5 % OPHTHALMIC SOLUTION    Place 1 drop into both eyes 3 (three) times daily.    PREDNISOLONE ACETATE (PRED FORTE) 1 % DRPS    Place 1 drop into both eyes 6 (six) times daily. for 10 days       Past Surgical History:   Procedure Laterality Date    CIRCUMCISION         SUBJECTIVE:  Health Maintenance  Health Maintenance Topics with due status: Not Due       Topic Last Completion Date    Low Dose Statin 09/10/2024    Diabetes Urine Screening 09/10/2024    Lipid Panel 11/26/2024    Hemoglobin A1c 11/26/2024    Eye Exam 11/26/2024    RSV Vaccine (Age 60+ and Pregnant patients) Not Due     Health Maintenance Due   Topic Date Due    Pneumococcal Vaccines (Age 0-64) (1 of 2 - PCV) Never done    Foot Exam  Never done    TETANUS VACCINE  Never done    Colorectal Cancer Screening  Never done    COVID-19 Vaccine (4 - 2024-25 season) 09/01/2024       Exam:  Vitals:    12/03/24 0822   BP: 124/81   BP Location: Left arm   Patient Position: Sitting   Pulse: 89   Resp: 18   Temp: 97.9 °F (36.6 °C)   TempSrc: Oral   SpO2: 98%   Weight: 56.6 kg (124 lb 11.2 oz)   Height: 5' 3" (1.6 m)     Weight: 56.6 kg (124 lb 11.2 oz)   Body mass index is 22.09 kg/m².      Physical Exam  Constitutional: NAD, alert, pleasant  Respiratory: CTAB, no wheezes, rales or rhonchi. No accessory muscle use  Eyes: EOMI  Cardiovascular: RRR, No m/r/g. No JVD. No LE edema  Gastrointestinal: BS+, nontender, nondistended  Integumentary: warm, dry, intact  Psych: AA&Ox3      ICD-10-CM ICD-9-CM   1. Hospital discharge follow-up  Z09 V67.59   2. Hypertension associated with diabetes  E11.59 250.80    I15.2 401.9   3. Ocular syphilis "  A52.71 095.8     363.13   4. Type 2 diabetes mellitus with hyperglycemia, without long-term current use of insulin  E11.65 250.00     790.29   5. High risk homosexual behavior  Z72.52 V69.2   6. Need for vaccination for pneumococcus  Z23 V03.82       1. Hospital discharge follow-up    2. Hypertension associated with diabetes  Overview:  At goal on losartan 50 mg daily. Asymptomatic    Continue current Rx meds        3. Ocular syphilis  Overview:  Followed by ID. Has completed treatment with little improvement in RPR. Received penicillin while inpatient and will be set up with outpatient treatments      4. Type 2 diabetes mellitus with hyperglycemia, without long-term current use of insulin  Overview:  A1c down from 7.1 to 6.2.  Son metformin 850 mg daily.. Not having diarrhea. Fasting sugars 114-150's.    Continue current Rx meds        5. High risk homosexual behavior    6. Need for vaccination for pneumococcus  -     pneumoc 20-lion conj-dip cr(PF) (PREVNAR-20 (PF)) injection Syrg 0.5 mL       Continue current meds and f/u with ID and ophthalmology as scheduled  Go to er for worsening sudden vision changes.   Rtc prn as he resides in [place]  Follow up: No follow-ups on file.      Care Plan/Goals: Reviewed   Goals    None

## 2024-12-04 ENCOUNTER — LAB REQUISITION (OUTPATIENT)
Dept: LAB | Facility: HOSPITAL | Age: 54
End: 2024-12-04
Payer: COMMERCIAL

## 2024-12-04 ENCOUNTER — PATIENT OUTREACH (OUTPATIENT)
Dept: ADMINISTRATIVE | Facility: CLINIC | Age: 54
End: 2024-12-04
Payer: COMMERCIAL

## 2024-12-04 DIAGNOSIS — A52.71 LATE SYPHILITIC OCULOPATHY: ICD-10-CM

## 2024-12-04 DIAGNOSIS — A53.9 SYPHILIS, UNSPECIFIED: ICD-10-CM

## 2024-12-04 LAB
ALBUMIN SERPL-MCNC: 4.7 G/DL (ref 3.5–5)
ALBUMIN/GLOB SERPL: 1.3 RATIO (ref 1.1–2)
ALP SERPL-CCNC: 79 UNIT/L (ref 40–150)
ALT SERPL-CCNC: 29 UNIT/L (ref 0–55)
ANION GAP SERPL CALC-SCNC: 11 MEQ/L
AST SERPL-CCNC: 25 UNIT/L (ref 5–34)
BASOPHILS # BLD AUTO: 0.03 X10(3)/MCL
BASOPHILS NFR BLD AUTO: 0.3 %
BILIRUB SERPL-MCNC: 0.6 MG/DL
BUN SERPL-MCNC: 10.8 MG/DL (ref 8.4–25.7)
CALCIUM SERPL-MCNC: 10.1 MG/DL (ref 8.4–10.2)
CHLORIDE SERPL-SCNC: 105 MMOL/L (ref 98–107)
CO2 SERPL-SCNC: 26 MMOL/L (ref 22–29)
CREAT SERPL-MCNC: 0.98 MG/DL (ref 0.72–1.25)
CREAT/UREA NIT SERPL: 11
EOSINOPHIL # BLD AUTO: 0.06 X10(3)/MCL (ref 0–0.9)
EOSINOPHIL NFR BLD AUTO: 0.5 %
ERYTHROCYTE [DISTWIDTH] IN BLOOD BY AUTOMATED COUNT: 12.1 % (ref 11.5–17)
GFR SERPLBLD CREATININE-BSD FMLA CKD-EPI: >60 ML/MIN/1.73/M2
GLOBULIN SER-MCNC: 3.7 GM/DL (ref 2.4–3.5)
GLUCOSE SERPL-MCNC: 128 MG/DL (ref 74–100)
HCT VFR BLD AUTO: 47.5 % (ref 42–52)
HGB BLD-MCNC: 16.2 G/DL (ref 14–18)
IMM GRANULOCYTES # BLD AUTO: 0.06 X10(3)/MCL (ref 0–0.04)
IMM GRANULOCYTES NFR BLD AUTO: 0.5 %
LYMPHOCYTES # BLD AUTO: 1.28 X10(3)/MCL (ref 0.6–4.6)
LYMPHOCYTES NFR BLD AUTO: 11 %
MCH RBC QN AUTO: 30.7 PG (ref 27–31)
MCHC RBC AUTO-ENTMCNC: 34.1 G/DL (ref 33–36)
MCV RBC AUTO: 90 FL (ref 80–94)
MONOCYTES # BLD AUTO: 0.7 X10(3)/MCL (ref 0.1–1.3)
MONOCYTES NFR BLD AUTO: 6 %
NEUTROPHILS # BLD AUTO: 9.47 X10(3)/MCL (ref 2.1–9.2)
NEUTROPHILS NFR BLD AUTO: 81.7 %
NRBC BLD AUTO-RTO: 0 %
PLATELET # BLD AUTO: 202 X10(3)/MCL (ref 130–400)
PMV BLD AUTO: 11.2 FL (ref 7.4–10.4)
POTASSIUM SERPL-SCNC: 4.1 MMOL/L (ref 3.5–5.1)
PROT SERPL-MCNC: 8.4 GM/DL (ref 6.4–8.3)
RBC # BLD AUTO: 5.28 X10(6)/MCL (ref 4.7–6.1)
SODIUM SERPL-SCNC: 142 MMOL/L (ref 136–145)
WBC # BLD AUTO: 11.6 X10(3)/MCL (ref 4.5–11.5)

## 2024-12-04 PROCEDURE — 80053 COMPREHEN METABOLIC PANEL: CPT

## 2024-12-04 PROCEDURE — 85025 COMPLETE CBC W/AUTO DIFF WBC: CPT

## 2024-12-04 NOTE — PROGRESS NOTES
C3 nurse attempted to contact Chance Montalvobeau Vu for a TCC post hospital discharge follow up call. No answer. Left voicemail with callback information. The patient completed a HOSFU with PCP and Ophthalmology on 12/3/24. He has a FU with [facility] ID on 12/10/24 @ 9:20am.

## 2024-12-05 ENCOUNTER — DOCUMENTATION ONLY (OUTPATIENT)
Dept: INFECTIOUS DISEASES | Facility: HOSPITAL | Age: 54
End: 2024-12-05
Payer: COMMERCIAL

## 2024-12-05 NOTE — PROGRESS NOTES
"[facility]  Infectious Diseases OPAT Lab Review Note      Medication:  Penicillin G 24,000,000 units IV daily per continuous infusion to complete 14 days    Infusion Company: [facility]    Outpatient start date:  11/28/2024  Outpatient stop date:  12/11/2024      Labs reviewed:     Lab Results   Component Value Date    WBC 11.60 (H) 12/04/2024    HGB 16.2 12/04/2024    HCT 47.5 12/04/2024    MCV 90.0 12/04/2024     12/04/2024      CMP  Sodium   Date Value Ref Range Status   12/04/2024 142 136 - 145 mmol/L Final     Potassium   Date Value Ref Range Status   12/04/2024 4.1 3.5 - 5.1 mmol/L Final     Chloride   Date Value Ref Range Status   12/04/2024 105 98 - 107 mmol/L Final     CO2   Date Value Ref Range Status   12/04/2024 26 22 - 29 mmol/L Final     Blood Urea Nitrogen   Date Value Ref Range Status   12/04/2024 10.8 8.4 - 25.7 mg/dL Final     Creatinine   Date Value Ref Range Status   12/04/2024 0.98 0.72 - 1.25 mg/dL Final     Calcium   Date Value Ref Range Status   12/04/2024 10.1 8.4 - 10.2 mg/dL Final     Albumin   Date Value Ref Range Status   12/04/2024 4.7 3.5 - 5.0 g/dL Final     Bilirubin Total   Date Value Ref Range Status   12/04/2024 0.6 <=1.5 mg/dL Final     ALP   Date Value Ref Range Status   12/04/2024 79 40 - 150 unit/L Final     AST   Date Value Ref Range Status   12/04/2024 25 5 - 34 unit/L Final     ALT   Date Value Ref Range Status   12/04/2024 29 0 - 55 unit/L Final     eGFR   Date Value Ref Range Status   12/04/2024 >60 mL/min/1.73/m2 Final         No results found for: "SEDRATE"   No results found for: "CRP"       Assessment / Plan:   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  No renal or liver implications  Continue current therapy is recommended   Weekly labs to monitor        Follow up ID appointment:  12/10/2024        FATOUMATA Dailey  [facility]    *Portions of this " note dictated using EMR integrated voice recognition software, and may be subject to voice recognition errors not corrected at proofreading. Please contact writer for clarification if needed. *

## 2024-12-05 NOTE — PROGRESS NOTES
C3 nurse spoke with Chance Vu for a TCC post hospital discharge follow up call. The patient completed a HOSFU with PCP on 12/3/24, and Ophthalmology on 12/3/24. He does not feel as though they were beneficial in helping him with the visual changes. He denies any pain or eye drainage, but is concerned about his vision. He will be seeing Dr. Grossman (retina specialist) on 12/6/24.     He also noted that he will run out of antibiotics by Saturday PM, and he is supposed to receive a refill on 12/6/24, but questions what to do if he does not receive these? He will discuss with MD tomorrow, and has the OOC# if needed.

## 2024-12-10 ENCOUNTER — OFFICE VISIT (OUTPATIENT)
Dept: INFECTIOUS DISEASES | Facility: CLINIC | Age: 54
End: 2024-12-10
Payer: COMMERCIAL

## 2024-12-10 VITALS
TEMPERATURE: 99 F | SYSTOLIC BLOOD PRESSURE: 127 MMHG | DIASTOLIC BLOOD PRESSURE: 81 MMHG | HEART RATE: 85 BPM | RESPIRATION RATE: 16 BRPM | WEIGHT: 125.44 LBS | BODY MASS INDEX: 22.23 KG/M2 | HEIGHT: 63 IN

## 2024-12-10 DIAGNOSIS — A52.8 LATE LATENT SYPHILIS: ICD-10-CM

## 2024-12-10 DIAGNOSIS — Z72.51 HIGH RISK SEXUAL BEHAVIOR, UNSPECIFIED TYPE: ICD-10-CM

## 2024-12-10 DIAGNOSIS — A52.71 OCULAR SYPHILIS: Primary | ICD-10-CM

## 2024-12-10 DIAGNOSIS — E11.65 TYPE 2 DIABETES MELLITUS WITH HYPERGLYCEMIA, WITHOUT LONG-TERM CURRENT USE OF INSULIN: ICD-10-CM

## 2024-12-10 PROCEDURE — 99214 OFFICE O/P EST MOD 30 MIN: CPT | Mod: PBBFAC

## 2024-12-10 PROCEDURE — 96372 THER/PROPH/DIAG INJ SC/IM: CPT | Mod: PBBFAC

## 2024-12-10 RX ADMIN — PENICILLIN G BENZATHINE 2.4 MILLION UNITS: 1200000 INJECTION, SUSPENSION INTRAMUSCULAR at 10:12

## 2024-12-10 NOTE — PROGRESS NOTES
"[facility] Outpatient INFECTIOUS DISEASES Clinic Note    CHIEF COMPLAINT: f/u ocular syphilis/OPAT    HISTORY OF PRESENT ILLNESS:   Patient is a 55 yo male with past medical history of T2DM, hypertension, ocular syphilis (reports treatment x3) who presents to clinic for follow up after hospitalization for ocular syphilis and presumed neurosyphilis. He was sent to the ED from ID clinic on 11/26 due to elevated RPR 1:128 in the setting of worsening blurry vision. He will complete 14 days IV penicillin 24 million units tomorrow 12/11/24. He denies any issues with his PICC line and any missed doses. He was seen by [facility] ophthalmology on 12/5 and reports he is not taken eye drops he was prescribed. He reports seeing a retina specialist in [place] recently who gave him unknown eye injections that have improved his vision.     Hospital course and treatment history:  He was first diagnosed 12/2022 with RPR 1:128 and was treated with IV PCN x14 days for suspected ocular syphilis. Vision improved post treatment, was followed by PCP (presumed) and ophthalmology with RPR downtrending to 1:32. RPR increased to 1:64 in 6/2023, which prompted retreatment. Per patient, was treated with ceftriaxone for 14 days. RPR again downtrended to 1:32. In 3/2024 RPR increased (no data available) in the setting of worsening vision changes. He was again treated for presumed ocular syphilis with ceftriaxone  for 14 days followed by Bicillin IM x3 doses. Per chart review, he also received linezolid during this time. He was followed by Dr Dunlap where RPR was trended. Was last seen 11/20/24 where RPR increased to 1:128 in the setting of worsening vision changes. He was sent to the ED for further evaluation. Per patient, this is his 4th treatment for presumed ocular syphilis. Ophthalmology did evaluate him on admission and noted OD uveitis and OS panuveitis which is highly suspicious for ocular syphilis. LP was attempted but was a "dry tap". He was started " empirically on PCN 24 million units IV q24h as CI. He will complete 14 day IV penicillin 24 million units tomorrow 12/11/24.     REVIEW OF SYSTEMS:  Review of Systems   Constitutional:  Negative for chills, fever and weight loss.   HENT:  Positive for sore throat.    Eyes:  Positive for blurred vision. Negative for pain.   Respiratory:  Negative for cough and shortness of breath.    Cardiovascular:  Negative for chest pain.   Gastrointestinal:  Negative for nausea and vomiting.   Genitourinary:  Negative for dysuria.   Musculoskeletal:  Negative for myalgias.   Skin:  Negative for rash.   Neurological:  Negative for dizziness and weakness.       PREVIOUS MEDICAL HISTORY:  Past Medical History:   Diagnosis Date    Diabetes mellitus, type 2     Hyperlipidemia     Hypertension     Syphilis, unspecified        PREVIOUS SURGICAL HISTORY:  Past Surgical History:   Procedure Laterality Date    CIRCUMCISION           ALLERGIES:  Review of patient's allergies indicates:  No Known Allergies      MEDICATIONS:  Current Outpatient Medications on File Prior to Visit   Medication Sig Dispense Refill    atorvastatin (LIPITOR) 20 MG tablet Take 1 tablet (20 mg total) by mouth once daily. 90 tablet 0    atropine 1% (ISOPTO ATROPINE) 1 % Drop Place 1 drop into both eyes 2 (two) times a day. 5 mL 3    ketorolac 0.5% (ACULAR) 0.5 % Drop Place 1 drop into both eyes 4 (four) times daily. 10 mL 5    losartan (COZAAR) 50 MG tablet Take 1 tablet (50 mg total) by mouth once daily. 90 tablet 0    metFORMIN (GLUCOPHAGE) 850 MG tablet Take 1 tablet (850 mg total) by mouth daily with breakfast. 90 tablet 0    moxifloxacin (VIGAMOX) 0.5 % ophthalmic solution Place 1 drop into both eyes 3 (three) times daily. (Patient not taking: Reported on 12/5/2024)      prednisoLONE acetate (PRED FORTE) 1 % DrpS Place 1 drop into both eyes 6 (six) times daily. for 10 days 10 mL 5     No current facility-administered medications on file prior to visit.           SOCIAL HISTORY:    reports that he has never smoked. He has never used smokeless tobacco. He reports that he does not drink alcohol and does not use drugs.      FAMILY HISTORY:   Family History   Problem Relation Name Age of Onset    Diabetes Father N/a        PHYSICAL EXAMINATION:  There were no vitals taken for this visit. There is no height or weight on file to calculate BMI.    Gen: awake, alert, NAD  HEENT: NC/AT, EOMi, anicteric sclera, no rhinorrhea, OP clear  Neck: no cervical LAD  CV: regular rate normal rhythm no murmur  Resp: easy WOB on RA CTABL no w/r/r  Abd: +BS, soft, NTTP, no HSM  Ext: warm, no LE edema  Skin: no rash  Neuro: CN 2-12 grossly intact, UE and LE anti-gravity, no focal deficits       LABORATORY DATA:  Lab Results   Component Value Date    WBC 11.60 (H) 12/04/2024    WBC 7.72 11/29/2024    WBC 7.84 11/28/2024    HGB 16.2 12/04/2024    HGB 14.9 11/29/2024    HGB 16.0 11/28/2024     12/04/2024     11/29/2024     11/28/2024    CREATININE 0.98 12/04/2024    CREATININE 0.75 11/29/2024    CREATININE 0.76 11/28/2024    ALT 29 12/04/2024    ALT 29 11/29/2024    ALT 26 11/28/2024    AST 25 12/04/2024    AST 19 11/29/2024    AST 17 11/28/2024    ALKPHOS 79 12/04/2024    ALKPHOS 60 11/29/2024    ALKPHOS 66 11/28/2024    BILITOT 0.6 12/04/2024    BILITOT 0.7 11/29/2024    BILITOT 0.6 11/28/2024       MICROBIOLOGY DATA:  RPR 1:128 12/2022 treated with IV penicillin x14 days, downtrended to 1:32  RPR 1:64 6/2023 treated with ceftriaxone for 14 days. RPR again downtrended to 1:32.   RPR increased (no data available) 3/2024 in the setting of worsening vision changes. He was again treated for presumed ocular syphilis with ceftriaxone for 14 days followed by Bicillin IM x3 doses.   RPR 11/20/24 increased to 1:128 in the setting of worsening vision changes. Treated with 14 day IV penicillin 24 million units with stop date of tomorrow 12/10/24    ASSESSMENT:    Ocular syphilis with  presumed neurosyphilis  Non-HIV status      PLAN:    - End date of 14 day IV penicillin 24 million units is tomorrow 12/10. After treatment is completed, recommend Bicillin 2.4 million units IM x3 qweekly. Will administer first dose today 12/10.   - Patient has ID appt scheduled in [place] in 1/2025. Discussed with patient at length about choosing to receive all of his care either in [place] or where he lives in [place] to optimize his care. Patient reports he will return to [place] upon completion of his 3 Bicillin injections. He will follow up with his ID doctor in [place] in January 2025. He will also need ID follow up in 3 months to recheck RPR.    RTC ARIK Dumont, MS4  [facility] [employer]

## 2024-12-11 ENCOUNTER — LAB REQUISITION (OUTPATIENT)
Dept: LAB | Facility: HOSPITAL | Age: 54
End: 2024-12-11
Payer: COMMERCIAL

## 2024-12-11 DIAGNOSIS — A52.71 LATE SYPHILITIC OCULOPATHY: ICD-10-CM

## 2024-12-11 LAB
ABS NEUT (OLG): 6.89 X10(3)/MCL (ref 2.1–9.2)
ALBUMIN SERPL-MCNC: 4.4 G/DL (ref 3.5–5)
ALBUMIN/GLOB SERPL: 1.2 RATIO (ref 1.1–2)
ALP SERPL-CCNC: 68 UNIT/L (ref 40–150)
ALT SERPL-CCNC: 22 UNIT/L (ref 0–55)
ANION GAP SERPL CALC-SCNC: 8 MEQ/L
AST SERPL-CCNC: 24 UNIT/L (ref 5–34)
BASOPHILS NFR BLD MANUAL: 0.19 X10(3)/MCL (ref 0–0.2)
BASOPHILS NFR BLD MANUAL: 2 %
BILIRUB SERPL-MCNC: 0.7 MG/DL
BUN SERPL-MCNC: 13 MG/DL (ref 8.4–25.7)
CALCIUM SERPL-MCNC: 9.4 MG/DL (ref 8.4–10.2)
CHLORIDE SERPL-SCNC: 103 MMOL/L (ref 98–107)
CO2 SERPL-SCNC: 27 MMOL/L (ref 22–29)
CREAT SERPL-MCNC: 0.81 MG/DL (ref 0.72–1.25)
CREAT/UREA NIT SERPL: 16
EOSINOPHIL NFR BLD MANUAL: 0.19 X10(3)/MCL (ref 0–0.9)
EOSINOPHIL NFR BLD MANUAL: 2 %
ERYTHROCYTE [DISTWIDTH] IN BLOOD BY AUTOMATED COUNT: 12.3 % (ref 11.5–17)
GFR SERPLBLD CREATININE-BSD FMLA CKD-EPI: >60 ML/MIN/1.73/M2
GLOBULIN SER-MCNC: 3.8 GM/DL (ref 2.4–3.5)
GLUCOSE SERPL-MCNC: 133 MG/DL (ref 74–100)
HCT VFR BLD AUTO: 46.9 % (ref 42–52)
HGB BLD-MCNC: 15.7 G/DL (ref 14–18)
INSTRUMENT WBC (OLG): 9.71 X10(3)/MCL
LYMPHOCYTES NFR BLD MANUAL: 1.84 X10(3)/MCL
LYMPHOCYTES NFR BLD MANUAL: 19 %
MCH RBC QN AUTO: 30.3 PG (ref 27–31)
MCHC RBC AUTO-ENTMCNC: 33.5 G/DL (ref 33–36)
MCV RBC AUTO: 90.5 FL (ref 80–94)
MONOCYTES NFR BLD MANUAL: 0.58 X10(3)/MCL (ref 0.1–1.3)
MONOCYTES NFR BLD MANUAL: 6 %
NEUTROPHILS NFR BLD MANUAL: 71 %
NRBC BLD AUTO-RTO: 0 %
PLATELET # BLD AUTO: 226 X10(3)/MCL (ref 130–400)
PLATELET # BLD EST: NORMAL 10*3/UL
PMV BLD AUTO: 11 FL (ref 7.4–10.4)
POTASSIUM SERPL-SCNC: 4.8 MMOL/L (ref 3.5–5.1)
PROT SERPL-MCNC: 8.2 GM/DL (ref 6.4–8.3)
RBC # BLD AUTO: 5.18 X10(6)/MCL (ref 4.7–6.1)
RBC MORPH BLD: NORMAL
SODIUM SERPL-SCNC: 138 MMOL/L (ref 136–145)
WBC # BLD AUTO: 9.71 X10(3)/MCL (ref 4.5–11.5)

## 2024-12-11 PROCEDURE — 85025 COMPLETE CBC W/AUTO DIFF WBC: CPT | Performed by: INTERNAL MEDICINE

## 2024-12-11 PROCEDURE — 80053 COMPREHEN METABOLIC PANEL: CPT | Performed by: INTERNAL MEDICINE

## 2024-12-12 ENCOUNTER — DOCUMENTATION ONLY (OUTPATIENT)
Dept: INFECTIOUS DISEASES | Facility: HOSPITAL | Age: 54
End: 2024-12-12
Payer: COMMERCIAL

## 2024-12-12 NOTE — PROGRESS NOTES
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 He was sent to the ED from ID clinic on 11/26 due to elevated RPR 1:128 in the setting of worsening blurry vision. He will complete 14 days IV penicillin 24 million units tomorrow 12/11/24. He denies any issues with his PICC line and any missed doses. He was seen by [facility] ophthalmology on 12/5 and reports he is not taken eye drops he was prescribed. He reports seeing a retina specialist in [place] recently who gave him unknown eye injections that have improved his vision.     Hospital course and treatment history:  He was first diagnosed 12/2022 with RPR 1:128 and was treated with IV PCN x14 days for suspected ocular syphilis. Vision improved post treatment, was followed by PCP (presumed) and ophthalmology with RPR downtrending to 1:32. RPR increased to 1:64 in 6/2023, which prompted retreatment. Per patient, was treated with ceftriaxone for 14 days. RPR again downtrended to 1:32. In 3/2024 RPR increased (no data available) in the setting of worsening vision changes. He was again treated for presumed ocular syphilis with ceftriaxone  for 14 days followed by Bicillin IM x3 doses. Per chart review, he also received linezolid during this time. He was followed by Dr Dunlap where RPR was trended. Was last seen 11/20/24 where RPR increased to 1:128 in the setting of worsening vision changes. He was sent to the ED for further evaluation. Per patient, this is his 4th treatment for presumed ocular syphilis. Ophthalmology did evaluate him on admission and noted OD uveitis and OS panuveitis which is highly suspicious for ocular syphilis. LP was attempted but was a "dry tap". He was started " empirically on PCN 24 million units IV q24h as CI. He will complete 14 day IV penicillin 24 million units tomorrow 12/11/24.     REVIEW OF SYSTEMS:  Review of Systems   Constitutional:  Negative for chills, fever and weight loss.   HENT:  Positive for sore throat.    Eyes:  Positive for blurred vision. Negative for pain.   Respiratory:  Negative for cough and shortness of breath.    Cardiovascular:  Negative for chest pain.   Gastrointestinal:  Negative for nausea and vomiting.   Genitourinary:  Negative for dysuria.   Musculoskeletal:  Negative for myalgias.   Skin:  Negative for rash.   Neurological:  Negative for dizziness and weakness.       PREVIOUS MEDICAL HISTORY:  Past Medical History:   Diagnosis Date    Diabetes mellitus, type 2     Hyperlipidemia     Hypertension     Syphilis, unspecified        PREVIOUS SURGICAL HISTORY:  Past Surgical History:   Procedure Laterality Date    CIRCUMCISION           ALLERGIES:  Review of patient's allergies indicates:  No Known Allergies      MEDICATIONS:  Current Outpatient Medications on File Prior to Visit   Medication Sig Dispense Refill    atorvastatin (LIPITOR) 20 MG tablet Take 1 tablet (20 mg total) by mouth once daily. 90 tablet 0    losartan (COZAAR) 50 MG tablet Take 1 tablet (50 mg total) by mouth once daily. 90 tablet 0    metFORMIN (GLUCOPHAGE) 850 MG tablet Take 1 tablet (850 mg total) by mouth daily with breakfast. 90 tablet 0    atropine 1% (ISOPTO ATROPINE) 1 % Drop Place 1 drop into both eyes 2 (two) times a day. (Patient not taking: Reported on 12/10/2024) 5 mL 3    ketorolac 0.5% (ACULAR) 0.5 % Drop Place 1 drop into both eyes 4 (four) times daily. (Patient not taking: Reported on 12/10/2024) 10 mL 5    moxifloxacin (VIGAMOX) 0.5 % ophthalmic solution Place 1 drop into both eyes 3 (three) times daily. (Patient not taking: Reported on 12/3/2024)      prednisoLONE acetate (PRED FORTE) 1 % DrpS Place 1 drop into both eyes 6 (six) times daily. for 10 days  "(Patient not taking: Reported on 12/10/2024) 10 mL 5     No current facility-administered medications on file prior to visit.          SOCIAL HISTORY:    reports that he has never smoked. He has never used smokeless tobacco. He reports that he does not drink alcohol and does not use drugs.      FAMILY HISTORY:   Family History   Problem Relation Name Age of Onset    Diabetes Father N/a        PHYSICAL EXAMINATION:  /81 (BP Location: Left arm, Patient Position: Sitting)   Pulse 85   Temp 98.7 °F (37.1 °C) (Oral)   Resp 16   Ht 5' 3" (1.6 m)   Wt 56.9 kg (125 lb 7.1 oz)   BMI 22.22 kg/m²  Body mass index is 22.22 kg/m².    Gen: awake, alert, NAD  HEENT: NC/AT, EOMi, anicteric sclera, no rhinorrhea, OP clear  Neck: no cervical LAD  CV: regular rate normal rhythm no murmur  Resp: easy WOB on RA CTABL no w/r/r  Abd: +BS, soft, NTTP, no HSM  Ext: warm, no LE edema  Skin: no rash  Neuro: CN 2-12 grossly intact, UE and LE anti-gravity, no focal deficits       LABORATORY DATA:  Lab Results   Component Value Date    WBC 9.71 12/11/2024    WBC 9.71 12/11/2024    WBC 11.60 (H) 12/04/2024    HGB 15.7 12/11/2024    HGB 16.2 12/04/2024    HGB 14.9 11/29/2024     12/11/2024     12/04/2024     11/29/2024    CREATININE 0.81 12/11/2024    CREATININE 0.98 12/04/2024    CREATININE 0.75 11/29/2024    ALT 22 12/11/2024    ALT 29 12/04/2024    ALT 29 11/29/2024    AST 24 12/11/2024    AST 25 12/04/2024    AST 19 11/29/2024    ALKPHOS 68 12/11/2024    ALKPHOS 79 12/04/2024    ALKPHOS 60 11/29/2024    BILITOT 0.7 12/11/2024    BILITOT 0.6 12/04/2024    BILITOT 0.7 11/29/2024       MICROBIOLOGY DATA:  RPR 1:128 12/2022 treated with IV penicillin x14 days, downtrended to 1:32  RPR 1:64 6/2023 treated with ceftriaxone for 14 days. RPR again downtrended to 1:32.   RPR increased (no data available) 3/2024 in the setting of worsening vision changes. He was again treated for presumed ocular syphilis with ceftriaxone " for 14 days followed by Bicillin IM x3 doses.   RPR 11/20/24 increased to 1:128 in the setting of worsening vision changes. 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 Per patient, was treated with ceftriaxone for 14 days  - RPR again downtrended to 1:32  - in 3/2024 RPR increased (no data available) in the setting of worsening vision changes. He was again treated for presumed ocular syphilis with ceftriaxone  for 14 days followed by Bicillin IM x3 doses. Per chart review, he also received linezolid during this time  - Was followed by Dr Dunlap where RPR was trended. Was last seen 11/20/24 where RPR increased to 1:128 in the setting of worsening vision changes.  - Started 4th treatment regimen for presumed NS on 11/27/24. LP attempted but was unsuccessful  2) Non-HIV status  3) high risk sexual activity (MSM)    Patient's vision did not improve drastically with IV PCN directed at Neurosyphilis. He did see opthalmology in clinic recently as well as a retinal specialist and underwent eye injections which did help with his vision. Therefore, unclear if his vision complaints are entirely due to ocular syphilis, although the uptrend in his RPR the coincided with his symptoms does seem awfully suspicious.     Once treatment is completed will proceed with full treatment for late latent syphilis with Bicillin IM x3 doses timed 1 week apart. Safe sex practices were encouraged. He needs to follow up with ophthalmology clinic closely. Will repeat RPR in 3 months to assess downtrend.      PLAN:    - End date of 14 day IV penicillin 24 million  units is tomorrow 12/10. After treatment is completed, recommend Bicillin 2.4 million units IM x3 qweekly. Will administer first dose today 12/10.   - Patient has ID appt scheduled in [place] in 1/2025. Discussed with patient at length about choosing to receive all of his care either in [place] or where he lives in [place] to optimize his care. Patient reports he will return to [place] upon completion of his 3 Bicillin injections. He will follow up with his ID doctor in [place] in January 2025. He will also need ID follow up in 3 months to recheck RPR.    RTC PRN    Nicole Dumont, MS4  [employer]     Attending attestation    I hereby acknowledge that I am relying upon documentation authored by a medical student working under my supervision and further I hereby attest that I have verified the student documentation or findings by personally performing the physical exam and medical decision making activities of the Evaluation and Management service to be billed. Note has been edited to reflect updated assessment and plan.    Vignesh Linton MD  Infectious Diseases Faculty   of Medicine

## 2024-12-12 NOTE — PROGRESS NOTES
"[facility]  Infectious Diseases OPAT Lab Review Note      Medication:  Penicillin G 24,000,000 units IV daily per continuous infusion to complete 14 days    Infusion Company: [facility]    Outpatient start date:  11/28/2024  Outpatient stop date:  12/11/2024      Labs reviewed:     Lab Results   Component Value Date    WBC 9.71 12/11/2024    WBC 9.71 12/11/2024    HGB 15.7 12/11/2024    HCT 46.9 12/11/2024    MCV 90.5 12/11/2024     12/11/2024      CMP  Sodium   Date Value Ref Range Status   12/11/2024 138 136 - 145 mmol/L Final     Potassium   Date Value Ref Range Status   12/11/2024 4.8 3.5 - 5.1 mmol/L Final     Chloride   Date Value Ref Range Status   12/11/2024 103 98 - 107 mmol/L Final     CO2   Date Value Ref Range Status   12/11/2024 27 22 - 29 mmol/L Final     Blood Urea Nitrogen   Date Value Ref Range Status   12/11/2024 13.0 8.4 - 25.7 mg/dL Final     Creatinine   Date Value Ref Range Status   12/11/2024 0.81 0.72 - 1.25 mg/dL Final     Calcium   Date Value Ref Range Status   12/11/2024 9.4 8.4 - 10.2 mg/dL Final     Albumin   Date Value Ref Range Status   12/11/2024 4.4 3.5 - 5.0 g/dL Final     Bilirubin Total   Date Value Ref Range Status   12/11/2024 0.7 <=1.5 mg/dL Final     ALP   Date Value Ref Range Status   12/11/2024 68 40 - 150 unit/L Final     AST   Date Value Ref Range Status   12/11/2024 24 5 - 34 unit/L Final     ALT   Date Value Ref Range Status   12/11/2024 22 0 - 55 unit/L Final     eGFR   Date Value Ref Range Status   12/11/2024 >60 mL/min/1.73/m2 Final         No results found for: "SEDRATE"   No results found for: "CRP"       Assessment / Plan:   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  No renal or liver implications  Treatment has completed  Pt will need Bicillin 2.4 million units IM weekly x 3 following completion  Okay to pull PICC          Follow up ID appointment:  ARIK Minor " Jabari Victoria, [id number]  [facility] Infectious Diseases    *Portions of this note dictated using EMR integrated voice recognition software, and may be subject to voice recognition errors not corrected at proofreading. Please contact writer for clarification if needed. *

## 2024-12-17 ENCOUNTER — CLINICAL SUPPORT (OUTPATIENT)
Dept: INFECTIOUS DISEASES | Facility: CLINIC | Age: 54
End: 2024-12-17
Payer: COMMERCIAL

## 2024-12-17 DIAGNOSIS — A52.71 OCULAR SYPHILIS: Primary | ICD-10-CM

## 2024-12-17 PROCEDURE — 99212 OFFICE O/P EST SF 10 MIN: CPT | Mod: PBBFAC

## 2024-12-17 NOTE — PROGRESS NOTES
Patient came in for scheduled Bicillin injection as ordered by Dr. DANA Linton. Bicillin 2.4 million units given IM to both buttocks (1.2 million units per buttock) without difficulty. Patient tolerated well.

## 2024-12-24 ENCOUNTER — CLINICAL SUPPORT (OUTPATIENT)
Dept: INFECTIOUS DISEASES | Facility: CLINIC | Age: 54
End: 2024-12-24
Payer: COMMERCIAL

## 2024-12-24 DIAGNOSIS — A52.71 OCULAR SYPHILIS: Primary | ICD-10-CM

## 2024-12-24 PROCEDURE — 99211 OFF/OP EST MAY X REQ PHY/QHP: CPT | Mod: PBBFAC

## 2024-12-24 PROCEDURE — 96372 THER/PROPH/DIAG INJ SC/IM: CPT | Mod: PBBFAC

## 2024-12-24 RX ADMIN — PENICILLIN G BENZATHINE 1.2 MILLION UNITS: 1200000 INJECTION, SUSPENSION INTRAMUSCULAR at 09:12

## 2024-12-24 NOTE — PROGRESS NOTES
Patient came in for scheduled Bicillin injection as ordered by Dr. DANA Linton. Bicillin 2.4 million units given IM to both buttocks (1.2 million units per buttocks) without difficulty. Patient tolerated well. To contact clinic prn.

## 2025-01-09 ENCOUNTER — EXTERNAL HOME HEALTH (OUTPATIENT)
Dept: HOME HEALTH SERVICES | Facility: HOSPITAL | Age: 55
End: 2025-01-09
Payer: COMMERCIAL